# Patient Record
Sex: FEMALE | Race: WHITE | NOT HISPANIC OR LATINO
[De-identification: names, ages, dates, MRNs, and addresses within clinical notes are randomized per-mention and may not be internally consistent; named-entity substitution may affect disease eponyms.]

---

## 2017-07-11 ENCOUNTER — APPOINTMENT (OUTPATIENT)
Dept: DERMATOLOGY | Facility: CLINIC | Age: 67
End: 2017-07-11

## 2017-08-13 ENCOUNTER — TRANSCRIPTION ENCOUNTER (OUTPATIENT)
Age: 67
End: 2017-08-13

## 2017-08-17 ENCOUNTER — APPOINTMENT (OUTPATIENT)
Dept: ORTHOPEDIC SURGERY | Facility: CLINIC | Age: 67
End: 2017-08-17
Payer: MEDICARE

## 2017-08-17 VITALS
HEART RATE: 66 BPM | DIASTOLIC BLOOD PRESSURE: 72 MMHG | BODY MASS INDEX: 23.61 KG/M2 | TEMPERATURE: 97.9 F | WEIGHT: 140 LBS | SYSTOLIC BLOOD PRESSURE: 131 MMHG | HEIGHT: 64.5 IN

## 2017-08-17 DIAGNOSIS — S43.401A UNSPECIFIED SPRAIN OF RIGHT SHOULDER JOINT, INITIAL ENCOUNTER: ICD-10-CM

## 2017-08-17 PROCEDURE — 99214 OFFICE O/P EST MOD 30 MIN: CPT

## 2017-10-02 ENCOUNTER — TRANSCRIPTION ENCOUNTER (OUTPATIENT)
Age: 67
End: 2017-10-02

## 2017-10-03 ENCOUNTER — OTHER (OUTPATIENT)
Age: 67
End: 2017-10-03

## 2017-10-03 DIAGNOSIS — M25.569 PAIN IN UNSPECIFIED KNEE: ICD-10-CM

## 2017-10-04 ENCOUNTER — APPOINTMENT (OUTPATIENT)
Dept: DERMATOLOGY | Facility: CLINIC | Age: 67
End: 2017-10-04

## 2017-10-06 ENCOUNTER — OTHER (OUTPATIENT)
Age: 67
End: 2017-10-06

## 2017-10-09 ENCOUNTER — APPOINTMENT (OUTPATIENT)
Dept: ORTHOPEDIC SURGERY | Facility: CLINIC | Age: 67
End: 2017-10-09
Payer: MEDICARE

## 2017-10-09 ENCOUNTER — APPOINTMENT (OUTPATIENT)
Dept: ORTHOPEDIC SURGERY | Facility: CLINIC | Age: 67
End: 2017-10-09

## 2017-10-09 ENCOUNTER — OTHER (OUTPATIENT)
Age: 67
End: 2017-10-09

## 2017-10-09 VITALS
HEIGHT: 64.5 IN | DIASTOLIC BLOOD PRESSURE: 81 MMHG | HEART RATE: 91 BPM | BODY MASS INDEX: 23.61 KG/M2 | WEIGHT: 140 LBS | SYSTOLIC BLOOD PRESSURE: 127 MMHG

## 2017-10-09 DIAGNOSIS — T14.8XXA OTHER INJURY OF UNSPECIFIED BODY REGION, INITIAL ENCOUNTER: ICD-10-CM

## 2017-10-09 PROBLEM — Z87.19 HISTORY OF ULCERATIVE COLITIS: Status: RESOLVED | Noted: 2017-10-09 | Resolved: 2017-10-09

## 2017-10-09 PROCEDURE — 99213 OFFICE O/P EST LOW 20 MIN: CPT

## 2017-10-09 PROCEDURE — 73564 X-RAY EXAM KNEE 4 OR MORE: CPT | Mod: RT

## 2017-10-19 ENCOUNTER — APPOINTMENT (OUTPATIENT)
Dept: ORTHOPEDIC SURGERY | Facility: CLINIC | Age: 67
End: 2017-10-19

## 2017-10-23 ENCOUNTER — APPOINTMENT (OUTPATIENT)
Dept: ORTHOPEDIC SURGERY | Facility: CLINIC | Age: 67
End: 2017-10-23

## 2017-11-03 ENCOUNTER — OTHER (OUTPATIENT)
Age: 67
End: 2017-11-03

## 2018-02-06 ENCOUNTER — RESULT REVIEW (OUTPATIENT)
Age: 68
End: 2018-02-06

## 2018-02-06 ENCOUNTER — APPOINTMENT (OUTPATIENT)
Dept: DERMATOLOGY | Facility: CLINIC | Age: 68
End: 2018-02-06
Payer: MEDICARE

## 2018-02-06 PROCEDURE — 99214 OFFICE O/P EST MOD 30 MIN: CPT | Mod: 25

## 2018-02-06 PROCEDURE — 11100 BX SKIN SUBCUTANEOUS&/MUCOUS MEMBRANE 1 LESION: CPT | Mod: 59

## 2018-02-06 PROCEDURE — 17110 DESTRUCTION B9 LES UP TO 14: CPT

## 2018-03-08 ENCOUNTER — APPOINTMENT (OUTPATIENT)
Dept: SURGICAL ONCOLOGY | Facility: CLINIC | Age: 68
End: 2018-03-08
Payer: MEDICARE

## 2018-03-08 VITALS
BODY MASS INDEX: 22.26 KG/M2 | HEIGHT: 64.5 IN | TEMPERATURE: 97.8 F | DIASTOLIC BLOOD PRESSURE: 79 MMHG | OXYGEN SATURATION: 99 % | HEART RATE: 70 BPM | SYSTOLIC BLOOD PRESSURE: 135 MMHG | WEIGHT: 132 LBS

## 2018-03-08 DIAGNOSIS — Z80.41 FAMILY HISTORY OF MALIGNANT NEOPLASM OF OVARY: ICD-10-CM

## 2018-03-08 DIAGNOSIS — Z78.9 OTHER SPECIFIED HEALTH STATUS: ICD-10-CM

## 2018-03-08 DIAGNOSIS — Z80.3 FAMILY HISTORY OF MALIGNANT NEOPLASM OF BREAST: ICD-10-CM

## 2018-03-08 PROCEDURE — 99205 OFFICE O/P NEW HI 60 MIN: CPT

## 2018-03-08 RX ORDER — MESALAMINE 800 MG/1
800 TABLET, DELAYED RELEASE ORAL
Refills: 0 | Status: ACTIVE | COMMUNITY

## 2018-03-27 ENCOUNTER — OUTPATIENT (OUTPATIENT)
Dept: OUTPATIENT SERVICES | Facility: HOSPITAL | Age: 68
LOS: 1 days | End: 2018-03-27
Payer: MEDICARE

## 2018-03-27 DIAGNOSIS — Z98.89 OTHER SPECIFIED POSTPROCEDURAL STATES: Chronic | ICD-10-CM

## 2018-03-27 DIAGNOSIS — Z01.818 ENCOUNTER FOR OTHER PREPROCEDURAL EXAMINATION: ICD-10-CM

## 2018-03-27 LAB
ANION GAP SERPL CALC-SCNC: 10 MMOL/L — SIGNIFICANT CHANGE UP (ref 5–17)
BUN SERPL-MCNC: 12 MG/DL — SIGNIFICANT CHANGE UP (ref 8–20)
CALCIUM SERPL-MCNC: 9.4 MG/DL — SIGNIFICANT CHANGE UP (ref 8.6–10.2)
CHLORIDE SERPL-SCNC: 106 MMOL/L — SIGNIFICANT CHANGE UP (ref 98–107)
CO2 SERPL-SCNC: 29 MMOL/L — SIGNIFICANT CHANGE UP (ref 22–29)
CREAT SERPL-MCNC: 0.68 MG/DL — SIGNIFICANT CHANGE UP (ref 0.5–1.3)
GLUCOSE SERPL-MCNC: 90 MG/DL — SIGNIFICANT CHANGE UP (ref 70–115)
POTASSIUM SERPL-MCNC: 4 MMOL/L — SIGNIFICANT CHANGE UP (ref 3.5–5.3)
POTASSIUM SERPL-SCNC: 4 MMOL/L — SIGNIFICANT CHANGE UP (ref 3.5–5.3)
SODIUM SERPL-SCNC: 145 MMOL/L — SIGNIFICANT CHANGE UP (ref 135–145)

## 2018-03-27 PROCEDURE — G0463: CPT

## 2018-03-27 PROCEDURE — 93005 ELECTROCARDIOGRAM TRACING: CPT

## 2018-03-27 PROCEDURE — 93010 ELECTROCARDIOGRAM REPORT: CPT

## 2018-03-27 PROCEDURE — 80048 BASIC METABOLIC PNL TOTAL CA: CPT

## 2018-03-27 PROCEDURE — 36415 COLL VENOUS BLD VENIPUNCTURE: CPT

## 2018-04-04 ENCOUNTER — APPOINTMENT (OUTPATIENT)
Dept: SURGICAL ONCOLOGY | Facility: AMBULATORY SURGERY CENTER | Age: 68
End: 2018-04-04
Payer: MEDICARE

## 2018-04-04 ENCOUNTER — RESULT REVIEW (OUTPATIENT)
Age: 68
End: 2018-04-04

## 2018-04-04 PROCEDURE — 14000 TIS TRNFR TRUNK 10 SQ CM/<: CPT | Mod: 59

## 2018-04-06 ENCOUNTER — TRANSCRIPTION ENCOUNTER (OUTPATIENT)
Age: 68
End: 2018-04-06

## 2018-04-06 ENCOUNTER — APPOINTMENT (OUTPATIENT)
Dept: DERMATOLOGY | Facility: CLINIC | Age: 68
End: 2018-04-06
Payer: MEDICARE

## 2018-04-06 PROCEDURE — 99213 OFFICE O/P EST LOW 20 MIN: CPT

## 2018-04-07 ENCOUNTER — APPOINTMENT (OUTPATIENT)
Dept: DERMATOLOGY | Facility: CLINIC | Age: 68
End: 2018-04-07
Payer: MEDICARE

## 2018-04-07 PROCEDURE — 99213 OFFICE O/P EST LOW 20 MIN: CPT

## 2018-04-19 ENCOUNTER — APPOINTMENT (OUTPATIENT)
Dept: SURGICAL ONCOLOGY | Facility: CLINIC | Age: 68
End: 2018-04-19
Payer: MEDICARE

## 2018-04-19 VITALS
DIASTOLIC BLOOD PRESSURE: 78 MMHG | SYSTOLIC BLOOD PRESSURE: 113 MMHG | WEIGHT: 138 LBS | OXYGEN SATURATION: 98 % | HEIGHT: 64 IN | HEART RATE: 57 BPM | BODY MASS INDEX: 23.56 KG/M2

## 2018-04-19 PROCEDURE — 99024 POSTOP FOLLOW-UP VISIT: CPT

## 2018-05-08 ENCOUNTER — APPOINTMENT (OUTPATIENT)
Dept: DERMATOLOGY | Facility: CLINIC | Age: 68
End: 2018-05-08
Payer: MEDICARE

## 2018-05-08 PROCEDURE — 17000 DESTRUCT PREMALG LESION: CPT

## 2018-05-08 PROCEDURE — 17003 DESTRUCT PREMALG LES 2-14: CPT

## 2018-05-08 PROCEDURE — 99213 OFFICE O/P EST LOW 20 MIN: CPT | Mod: 25

## 2018-08-08 ENCOUNTER — APPOINTMENT (OUTPATIENT)
Dept: DERMATOLOGY | Facility: CLINIC | Age: 68
End: 2018-08-08
Payer: MEDICARE

## 2018-08-08 PROCEDURE — 17000 DESTRUCT PREMALG LESION: CPT

## 2018-08-08 PROCEDURE — 99213 OFFICE O/P EST LOW 20 MIN: CPT | Mod: 25

## 2018-08-30 ENCOUNTER — APPOINTMENT (OUTPATIENT)
Dept: SURGICAL ONCOLOGY | Facility: CLINIC | Age: 68
End: 2018-08-30
Payer: MEDICARE

## 2018-08-30 VITALS
TEMPERATURE: 98.5 F | HEIGHT: 64 IN | BODY MASS INDEX: 24.07 KG/M2 | OXYGEN SATURATION: 98 % | SYSTOLIC BLOOD PRESSURE: 116 MMHG | WEIGHT: 141 LBS | DIASTOLIC BLOOD PRESSURE: 74 MMHG | HEART RATE: 72 BPM

## 2018-08-30 PROCEDURE — 99214 OFFICE O/P EST MOD 30 MIN: CPT

## 2018-12-13 ENCOUNTER — APPOINTMENT (OUTPATIENT)
Dept: SURGICAL ONCOLOGY | Facility: CLINIC | Age: 68
End: 2018-12-13
Payer: MEDICARE

## 2018-12-13 VITALS
OXYGEN SATURATION: 98 % | DIASTOLIC BLOOD PRESSURE: 79 MMHG | SYSTOLIC BLOOD PRESSURE: 120 MMHG | HEIGHT: 64 IN | WEIGHT: 146 LBS | TEMPERATURE: 98.1 F | BODY MASS INDEX: 24.92 KG/M2 | HEART RATE: 69 BPM

## 2018-12-13 PROCEDURE — 99214 OFFICE O/P EST MOD 30 MIN: CPT

## 2019-01-06 ENCOUNTER — INPATIENT (INPATIENT)
Facility: HOSPITAL | Age: 69
LOS: 4 days | Discharge: HOME | DRG: 57 | End: 2019-01-11
Attending: FAMILY MEDICINE | Admitting: HOSPITALIST
Payer: MEDICARE

## 2019-01-06 VITALS
RESPIRATION RATE: 18 BRPM | TEMPERATURE: 98 F | HEIGHT: 64 IN | WEIGHT: 139.99 LBS | HEART RATE: 63 BPM | OXYGEN SATURATION: 97 % | SYSTOLIC BLOOD PRESSURE: 115 MMHG | DIASTOLIC BLOOD PRESSURE: 77 MMHG

## 2019-01-06 DIAGNOSIS — Z98.89 OTHER SPECIFIED POSTPROCEDURAL STATES: Chronic | ICD-10-CM

## 2019-01-06 DIAGNOSIS — E86.0 DEHYDRATION: ICD-10-CM

## 2019-01-06 LAB
ALBUMIN SERPL ELPH-MCNC: 4.3 G/DL — SIGNIFICANT CHANGE UP (ref 3.3–5.2)
ALP SERPL-CCNC: 70 U/L — SIGNIFICANT CHANGE UP (ref 40–120)
ALT FLD-CCNC: 13 U/L — SIGNIFICANT CHANGE UP
ANION GAP SERPL CALC-SCNC: 11 MMOL/L — SIGNIFICANT CHANGE UP (ref 5–17)
APPEARANCE UR: CLEAR — SIGNIFICANT CHANGE UP
APTT BLD: 30.6 SEC — SIGNIFICANT CHANGE UP (ref 27.5–36.3)
AST SERPL-CCNC: 15 U/L — SIGNIFICANT CHANGE UP
BACTERIA # UR AUTO: ABNORMAL
BASOPHILS # BLD AUTO: 0 K/UL — SIGNIFICANT CHANGE UP (ref 0–0.2)
BASOPHILS NFR BLD AUTO: 0.7 % — SIGNIFICANT CHANGE UP (ref 0–2)
BILIRUB SERPL-MCNC: 0.5 MG/DL — SIGNIFICANT CHANGE UP (ref 0.4–2)
BILIRUB UR-MCNC: NEGATIVE — SIGNIFICANT CHANGE UP
BUN SERPL-MCNC: 12 MG/DL — SIGNIFICANT CHANGE UP (ref 8–20)
CALCIUM SERPL-MCNC: 9.1 MG/DL — SIGNIFICANT CHANGE UP (ref 8.6–10.2)
CHLORIDE SERPL-SCNC: 103 MMOL/L — SIGNIFICANT CHANGE UP (ref 98–107)
CK SERPL-CCNC: 16 U/L — LOW (ref 25–170)
CO2 SERPL-SCNC: 27 MMOL/L — SIGNIFICANT CHANGE UP (ref 22–29)
COLOR SPEC: YELLOW — SIGNIFICANT CHANGE UP
CREAT SERPL-MCNC: 0.8 MG/DL — SIGNIFICANT CHANGE UP (ref 0.5–1.3)
DIFF PNL FLD: NEGATIVE — SIGNIFICANT CHANGE UP
EOSINOPHIL # BLD AUTO: 0.3 K/UL — SIGNIFICANT CHANGE UP (ref 0–0.5)
EOSINOPHIL NFR BLD AUTO: 5.2 % — SIGNIFICANT CHANGE UP (ref 0–6)
EPI CELLS # UR: SIGNIFICANT CHANGE UP
GLUCOSE SERPL-MCNC: 97 MG/DL — SIGNIFICANT CHANGE UP (ref 70–115)
GLUCOSE UR QL: NEGATIVE MG/DL — SIGNIFICANT CHANGE UP
HCT VFR BLD CALC: 41.5 % — SIGNIFICANT CHANGE UP (ref 37–47)
HGB BLD-MCNC: 13.5 G/DL — SIGNIFICANT CHANGE UP (ref 12–16)
INR BLD: 1.1 RATIO — SIGNIFICANT CHANGE UP (ref 0.88–1.16)
KETONES UR-MCNC: NEGATIVE — SIGNIFICANT CHANGE UP
LEUKOCYTE ESTERASE UR-ACNC: ABNORMAL
LYMPHOCYTES # BLD AUTO: 1.5 K/UL — SIGNIFICANT CHANGE UP (ref 1–4.8)
LYMPHOCYTES # BLD AUTO: 27.4 % — SIGNIFICANT CHANGE UP (ref 20–55)
MCHC RBC-ENTMCNC: 28.4 PG — SIGNIFICANT CHANGE UP (ref 27–31)
MCHC RBC-ENTMCNC: 32.5 G/DL — SIGNIFICANT CHANGE UP (ref 32–36)
MCV RBC AUTO: 87.2 FL — SIGNIFICANT CHANGE UP (ref 81–99)
MONOCYTES # BLD AUTO: 0.6 K/UL — SIGNIFICANT CHANGE UP (ref 0–0.8)
MONOCYTES NFR BLD AUTO: 11.2 % — HIGH (ref 3–10)
NEUTROPHILS # BLD AUTO: 3 K/UL — SIGNIFICANT CHANGE UP (ref 1.8–8)
NEUTROPHILS NFR BLD AUTO: 55.1 % — SIGNIFICANT CHANGE UP (ref 37–73)
NITRITE UR-MCNC: NEGATIVE — SIGNIFICANT CHANGE UP
NT-PROBNP SERPL-SCNC: 104 PG/ML — SIGNIFICANT CHANGE UP (ref 0–300)
PH UR: 7 — SIGNIFICANT CHANGE UP (ref 5–8)
PLATELET # BLD AUTO: 207 K/UL — SIGNIFICANT CHANGE UP (ref 150–400)
POTASSIUM SERPL-MCNC: 4.6 MMOL/L — SIGNIFICANT CHANGE UP (ref 3.5–5.3)
POTASSIUM SERPL-SCNC: 4.6 MMOL/L — SIGNIFICANT CHANGE UP (ref 3.5–5.3)
PROT SERPL-MCNC: 6.6 G/DL — SIGNIFICANT CHANGE UP (ref 6.6–8.7)
PROT UR-MCNC: NEGATIVE MG/DL — SIGNIFICANT CHANGE UP
PROTHROM AB SERPL-ACNC: 12.7 SEC — SIGNIFICANT CHANGE UP (ref 10–12.9)
RAPID RVP RESULT: SIGNIFICANT CHANGE UP
RBC # BLD: 4.76 M/UL — SIGNIFICANT CHANGE UP (ref 4.4–5.2)
RBC # FLD: 12.6 % — SIGNIFICANT CHANGE UP (ref 11–15.6)
RBC CASTS # UR COMP ASSIST: SIGNIFICANT CHANGE UP /HPF (ref 0–4)
SODIUM SERPL-SCNC: 141 MMOL/L — SIGNIFICANT CHANGE UP (ref 135–145)
SP GR SPEC: 1.01 — SIGNIFICANT CHANGE UP (ref 1.01–1.02)
T4 AB SER-ACNC: 10.9 UG/DL — SIGNIFICANT CHANGE UP (ref 4.5–12)
TROPONIN T SERPL-MCNC: <0.01 NG/ML — SIGNIFICANT CHANGE UP (ref 0–0.06)
TSH SERPL-MCNC: 0.86 UIU/ML — SIGNIFICANT CHANGE UP (ref 0.27–4.2)
UROBILINOGEN FLD QL: NEGATIVE MG/DL — SIGNIFICANT CHANGE UP
WBC # BLD: 5.4 K/UL — SIGNIFICANT CHANGE UP (ref 4.8–10.8)
WBC # FLD AUTO: 5.4 K/UL — SIGNIFICANT CHANGE UP (ref 4.8–10.8)
WBC UR QL: SIGNIFICANT CHANGE UP

## 2019-01-06 PROCEDURE — 74177 CT ABD & PELVIS W/CONTRAST: CPT | Mod: 26

## 2019-01-06 PROCEDURE — 99285 EMERGENCY DEPT VISIT HI MDM: CPT

## 2019-01-06 PROCEDURE — 99223 1ST HOSP IP/OBS HIGH 75: CPT

## 2019-01-06 PROCEDURE — 93010 ELECTROCARDIOGRAM REPORT: CPT

## 2019-01-06 PROCEDURE — 70450 CT HEAD/BRAIN W/O DYE: CPT | Mod: 26

## 2019-01-06 PROCEDURE — 71045 X-RAY EXAM CHEST 1 VIEW: CPT | Mod: 26

## 2019-01-06 RX ORDER — CEFTRIAXONE 500 MG/1
1 INJECTION, POWDER, FOR SOLUTION INTRAMUSCULAR; INTRAVENOUS ONCE
Qty: 0 | Refills: 0 | Status: DISCONTINUED | OUTPATIENT
Start: 2019-01-06 | End: 2019-01-06

## 2019-01-06 RX ORDER — LEVOTHYROXINE SODIUM 125 MCG
125 TABLET ORAL DAILY
Qty: 0 | Refills: 0 | Status: DISCONTINUED | OUTPATIENT
Start: 2019-01-06 | End: 2019-01-11

## 2019-01-06 RX ORDER — DIAZEPAM 5 MG
1 TABLET ORAL
Qty: 0 | Refills: 0 | COMMUNITY

## 2019-01-06 RX ORDER — ENOXAPARIN SODIUM 100 MG/ML
40 INJECTION SUBCUTANEOUS EVERY 24 HOURS
Qty: 0 | Refills: 0 | Status: DISCONTINUED | OUTPATIENT
Start: 2019-01-06 | End: 2019-01-11

## 2019-01-06 RX ORDER — METRONIDAZOLE 500 MG
500 TABLET ORAL ONCE
Qty: 0 | Refills: 0 | Status: COMPLETED | OUTPATIENT
Start: 2019-01-06 | End: 2019-01-06

## 2019-01-06 RX ORDER — IPRATROPIUM/ALBUTEROL SULFATE 18-103MCG
3 AEROSOL WITH ADAPTER (GRAM) INHALATION EVERY 6 HOURS
Qty: 0 | Refills: 0 | Status: DISCONTINUED | OUTPATIENT
Start: 2019-01-06 | End: 2019-01-07

## 2019-01-06 RX ORDER — SODIUM CHLORIDE 9 MG/ML
1000 INJECTION INTRAMUSCULAR; INTRAVENOUS; SUBCUTANEOUS ONCE
Qty: 0 | Refills: 0 | Status: COMPLETED | OUTPATIENT
Start: 2019-01-06 | End: 2019-01-06

## 2019-01-06 RX ORDER — SODIUM CHLORIDE 9 MG/ML
2000 INJECTION INTRAMUSCULAR; INTRAVENOUS; SUBCUTANEOUS ONCE
Qty: 0 | Refills: 0 | Status: COMPLETED | OUTPATIENT
Start: 2019-01-06 | End: 2019-01-06

## 2019-01-06 RX ORDER — ALBUTEROL 90 UG/1
1 AEROSOL, METERED ORAL EVERY 4 HOURS
Qty: 0 | Refills: 0 | Status: DISCONTINUED | OUTPATIENT
Start: 2019-01-06 | End: 2019-01-11

## 2019-01-06 RX ORDER — PANTOPRAZOLE SODIUM 20 MG/1
40 TABLET, DELAYED RELEASE ORAL EVERY 12 HOURS
Qty: 0 | Refills: 0 | Status: DISCONTINUED | OUTPATIENT
Start: 2019-01-06 | End: 2019-01-11

## 2019-01-06 RX ORDER — SACCHAROMYCES BOULARDII 250 MG
250 POWDER IN PACKET (EA) ORAL
Qty: 0 | Refills: 0 | Status: DISCONTINUED | OUTPATIENT
Start: 2019-01-06 | End: 2019-01-07

## 2019-01-06 RX ORDER — MECLIZINE HCL 12.5 MG
25 TABLET ORAL EVERY 6 HOURS
Qty: 0 | Refills: 0 | Status: DISCONTINUED | OUTPATIENT
Start: 2019-01-06 | End: 2019-01-11

## 2019-01-06 RX ORDER — SUCRALFATE 1 G
1 TABLET ORAL
Qty: 0 | Refills: 0 | Status: DISCONTINUED | OUTPATIENT
Start: 2019-01-06 | End: 2019-01-11

## 2019-01-06 RX ORDER — CIPROFLOXACIN LACTATE 400MG/40ML
400 VIAL (ML) INTRAVENOUS ONCE
Qty: 0 | Refills: 0 | Status: COMPLETED | OUTPATIENT
Start: 2019-01-06 | End: 2019-01-06

## 2019-01-06 RX ORDER — SODIUM CHLORIDE 9 MG/ML
1000 INJECTION INTRAMUSCULAR; INTRAVENOUS; SUBCUTANEOUS
Qty: 0 | Refills: 0 | Status: DISCONTINUED | OUTPATIENT
Start: 2019-01-06 | End: 2019-01-09

## 2019-01-06 RX ADMIN — Medication 100 MILLIGRAM(S): at 18:55

## 2019-01-06 RX ADMIN — Medication 100 MILLIGRAM(S): at 18:05

## 2019-01-06 RX ADMIN — Medication 500 MILLIGRAM(S): at 19:55

## 2019-01-06 RX ADMIN — Medication 400 MILLIGRAM(S): at 19:05

## 2019-01-06 RX ADMIN — SODIUM CHLORIDE 1000 MILLILITER(S): 9 INJECTION INTRAMUSCULAR; INTRAVENOUS; SUBCUTANEOUS at 14:39

## 2019-01-06 RX ADMIN — SODIUM CHLORIDE 2000 MILLILITER(S): 9 INJECTION INTRAMUSCULAR; INTRAVENOUS; SUBCUTANEOUS at 19:06

## 2019-01-06 RX ADMIN — SODIUM CHLORIDE 150 MILLILITER(S): 9 INJECTION INTRAMUSCULAR; INTRAVENOUS; SUBCUTANEOUS at 12:42

## 2019-01-06 RX ADMIN — SODIUM CHLORIDE 2000 MILLILITER(S): 9 INJECTION INTRAMUSCULAR; INTRAVENOUS; SUBCUTANEOUS at 18:06

## 2019-01-06 RX ADMIN — SODIUM CHLORIDE 1000 MILLILITER(S): 9 INJECTION INTRAMUSCULAR; INTRAVENOUS; SUBCUTANEOUS at 13:39

## 2019-01-06 NOTE — ED ADULT TRIAGE NOTE - CHIEF COMPLAINT QUOTE
'I feel sick, I am weak and dizzy, I saw Dr Allred yesterday and they said if I wasn't better to go to the ED. " Pt A & OX4.

## 2019-01-06 NOTE — ED STATDOCS - PROGRESS NOTE DETAILS
69 y/o F pt with hx of loop recorder, IBS, appendicitis, diverticulitis, and hypothyroidism presents to ED c/o worsening weakness, and dizziness. Pt reports she has been sick for about 2 weeks with head pressure, decreased appetite, lightheadedness, nausea, with minimal vomiting. Pt has seen multiple doctors within the last 3-4 weeks; can't seem to figure out what is wrong with her according to pt. Her first visit with the doctor was on 12/23 and her most recent visit was yesterday. Pt had labs and a sonogram (thyroid) done at [facility] office yesterday; and was told she may be anemic or it may be a fibroid complication. Pt has been able to tolerate PO fluids; pt states she ate 2 bowls of soup yesterday. Denies dysuria. No further complaints at this time.

## 2019-01-06 NOTE — ED STATDOCS - OBJECTIVE STATEMENT
67 y/o F pt with hx of presents to ED c/o dehydration with associated weakness, and dizziness. Denies . No further complaints at this time.

## 2019-01-06 NOTE — H&P ADULT - ASSESSMENT
68 year old female with PMH UC, hypothyroidism presenting with weakness,  dizziness and dysphagia - unclear etiology. Diagnostics unrevealing, mild colitis - doubt infection. Mild hypoxemia (93-95% throughout interaction)  Multiple visits to clinicians without respite  Now debilitating and interfering with ADL    Dizziness - r/o ischemia, tumour, arrhythmia - Monitor, TTE, MRI, MRA. Neurology consult. PT    Ulcerative Colitis - with mild flare low residue diet, Mesalamine regularly. No antibiotics    Hypoxia - monitor, O2, Nebs. CTA chest    Dysphagia - likely gastritis/esophagitis; Carafate, PPI    Prophylactic measure - LMWH,

## 2019-01-06 NOTE — ED PROVIDER NOTE - OBJECTIVE STATEMENT
The patient is a 68 year old female presents with 2 weeks history of generalized weakness and LH and decreased PO intake.  The patient seen PMD, [facility] 3 times in past 2 weeks and unable to give diagnosis. No CP, No SOB, Mild HA, Mild Abd pain

## 2019-01-06 NOTE — H&P ADULT - HISTORY OF PRESENT ILLNESS
68 year old female with PMH UC 68 year old female with PMH UC, Hypothyroidism presents with 2 week history of not feeling well.   She was fine before December and suffered from Colitis all through the month for which she has been having Mesalamine and Imodium intermittently. She has frequent multiple small bowel movement which are occasionally bloody which she attributes to her internal hemorrhoids. She states she cannot swallow and also has feeling of something stuck in throat and states she had a thyroid sonogram recently. Occasional reflux and frequent burping. Denies any odynophagia.    Her major complaints are feeling very weak, dizzy and lightheaded - so much so that she is does not ambulate much and stays home. She states her head feels like a vise and gestures to right side of neck. She has been undergoing acupuncture.   She has been to her PMD office multiple times as well as an urgent care center and came to the ER as worse today.

## 2019-01-06 NOTE — H&P ADULT - NSHPREVIEWOFSYSTEMS_GEN_ALL_CORE
Denies any fever, chills, chest pain, palpitations, dyspnea, dysuria, frequency, arthralgias or rash

## 2019-01-07 DIAGNOSIS — K57.92 DIVERTICULITIS OF INTESTINE, PART UNSPECIFIED, WITHOUT PERFORATION OR ABSCESS WITHOUT BLEEDING: ICD-10-CM

## 2019-01-07 DIAGNOSIS — E86.0 DEHYDRATION: ICD-10-CM

## 2019-01-07 DIAGNOSIS — R42 DIZZINESS AND GIDDINESS: ICD-10-CM

## 2019-01-07 LAB
CHOLEST SERPL-MCNC: 138 MG/DL — SIGNIFICANT CHANGE UP (ref 110–199)
ERYTHROCYTE [SEDIMENTATION RATE] IN BLOOD: 12 MM/HR — SIGNIFICANT CHANGE UP (ref 0–20)
HBA1C BLD-MCNC: 5.2 % — SIGNIFICANT CHANGE UP (ref 4–5.6)
HDLC SERPL-MCNC: 24 MG/DL — LOW
LIPID PNL WITH DIRECT LDL SERPL: 71 MG/DL — SIGNIFICANT CHANGE UP
TOTAL CHOLESTEROL/HDL RATIO MEASUREMENT: 6 RATIO — SIGNIFICANT CHANGE UP (ref 3.3–7.1)
TRIGL SERPL-MCNC: 217 MG/DL — HIGH (ref 10–200)

## 2019-01-07 PROCEDURE — 99223 1ST HOSP IP/OBS HIGH 75: CPT

## 2019-01-07 PROCEDURE — 70544 MR ANGIOGRAPHY HEAD W/O DYE: CPT | Mod: 26

## 2019-01-07 PROCEDURE — 72141 MRI NECK SPINE W/O DYE: CPT | Mod: 26

## 2019-01-07 PROCEDURE — 70548 MR ANGIOGRAPHY NECK W/DYE: CPT | Mod: 26

## 2019-01-07 PROCEDURE — 93306 TTE W/DOPPLER COMPLETE: CPT | Mod: 26

## 2019-01-07 PROCEDURE — 70551 MRI BRAIN STEM W/O DYE: CPT | Mod: 26

## 2019-01-07 RX ADMIN — Medication 3 MILLILITER(S): at 04:12

## 2019-01-07 RX ADMIN — ENOXAPARIN SODIUM 40 MILLIGRAM(S): 100 INJECTION SUBCUTANEOUS at 05:18

## 2019-01-07 RX ADMIN — Medication 125 MICROGRAM(S): at 05:18

## 2019-01-07 RX ADMIN — Medication 1 GRAM(S): at 05:19

## 2019-01-07 RX ADMIN — Medication 250 MILLIGRAM(S): at 05:18

## 2019-01-07 RX ADMIN — Medication 25 MILLIGRAM(S): at 05:18

## 2019-01-07 RX ADMIN — SODIUM CHLORIDE 150 MILLILITER(S): 9 INJECTION INTRAMUSCULAR; INTRAVENOUS; SUBCUTANEOUS at 19:42

## 2019-01-07 RX ADMIN — PANTOPRAZOLE SODIUM 40 MILLIGRAM(S): 20 TABLET, DELAYED RELEASE ORAL at 05:18

## 2019-01-07 NOTE — PROGRESS NOTE ADULT - SUBJECTIVE AND OBJECTIVE BOX
SUBJECTIVE    pt complains of the shakes/tremors and difficulty swallowing    REVIEW OF SYSTEMS    General: Not in any pain	    Skin/Breast: No rash  	  ENMT: No visual problems, no sore throat	    Respiratory and Thorax: No cough, No CP, No SOB  	  Cardiovascular: No CP, No palpitations    Gastrointestinal: No Abd pain, No N/V/D    Musculoskeletal: No Joint pain, No back pain	    Neurological: No headache    Psychiatric: No anxiety      OBJECTIVE    Vital Signs Last 24 Hrs  T(C): 36.8 (01-07-19 @ 18:09), Max: 37.2 (01-07-19 @ 03:19)  T(F): 98.2 (01-07-19 @ 18:09), Max: 98.9 (01-07-19 @ 03:19)  HR: 65 (01-07-19 @ 18:09) (58 - 74)  BP: 103/67 (01-07-19 @ 18:09) (99/61 - 132/63)  BP(mean): 82 (01-06-19 @ 21:20) (82 - 82)  RR: 18 (01-07-19 @ 15:36) (16 - 18)  SpO2: 95% (01-07-19 @ 18:09) (95% - 98%)    PHYSICAL EXAM:    Constitutional: Not in any distress    Eyes: No conjunctival injection    ENMT: No oral lesions    Neck: No nodes, no adenopathy    Back: Straight, no defects    Respiratory: clear b/l    Cardiovascular: RRR, no murmur    Gastrointestinal: soft, NT, ND    Extremities: No edema, no erythema    Neurological: no focal deficit    Skin: No rash      MEDICATIONS  (STANDING):  ALBUTerol    90 MICROgram(s) HFA Inhaler 1 Puff(s) Inhalation every 4 hours  enoxaparin Injectable 40 milliGRAM(s) SubCutaneous every 24 hours  levothyroxine 125 MICROGram(s) Oral daily  pantoprazole    Tablet 40 milliGRAM(s) Oral every 12 hours  sodium chloride 0.9%. 1000 milliLiter(s) (150 mL/Hr) IV Continuous <Continuous>  sucralfate 1 Gram(s) Oral four times a day    MEDICATIONS  (PRN):  meclizine 25 milliGRAM(s) Oral every 6 hours PRN Dizziness    CARDIAC MARKERS ( 06 Jan 2019 13:12 )  x     / <0.01 ng/mL / 16 U/L / x     / x                                13.5   5.4   )-----------( 207      ( 06 Jan 2019 13:12 )             41.5     06 Jan 2019 13:12    141    |  103    |  12.0   ----------------------------<  97     4.6     |  27.0   |  0.80     Ca    9.1        06 Jan 2019 13:12    TPro  6.6    /  Alb  4.3    /  TBili  0.5    /  DBili  x      /  AST  15     /  ALT  13     /  AlkPhos  70     06 Jan 2019 13:12    Allergies    Sulfacetamide Sodium (Hives)    Intolerances

## 2019-01-07 NOTE — CONSULT NOTE ADULT - SUBJECTIVE AND OBJECTIVE BOX
[facility]                                        Neurology at [place]                                  My[facility]na Faust [facility]                                      [address]                                           [place], [place], [address]                                                [phone]        CC: headache, dizziness, and tremor.    HISTORY:  The patient is a 68y Female who reports that for the past few weeks she has been having pressure type headache and dizziness. She describes lightheaded sensations rather than true vertigo.   she has also had difficulty swallowing due to pain in her throat.   The symptoms have been with her on  a daily basis and have been gradually getting worse.   There is no associated limb weakness.     She also reports that she has been having tremors of the right hand for the past several months.     PAST MEDICAL & SURGICAL HISTORY:  Appendicitis  Other specified hypothyroidism  IBS (irritable bowel syndrome)  She reports that she does acupuncture for colitis.    Diverticulitis  History of appendectomy  History of cholecystectomy    MEDICATION PRIOR TO ADMISSION:  Loratadine, Levothyroxine, Mesalamine, Valium.    MEDICATIONS  (STANDING):  ALBUTerol    90 MICROgram(s) HFA Inhaler 1 Puff(s) Inhalation every 4 hours  ALBUTerol/ipratropium for Nebulization 3 milliLiter(s) Nebulizer every 6 hours  enoxaparin Injectable 40 milliGRAM(s) SubCutaneous every 24 hours  levothyroxine 125 MICROGram(s) Oral daily  pantoprazole    Tablet 40 milliGRAM(s) Oral every 12 hours  saccharomyces boulardii 250 milliGRAM(s) Oral two times a day  sodium chloride 0.9%. 1000 milliLiter(s) (150 mL/Hr) IV Continuous <Continuous>  sucralfate 1 Gram(s) Oral four times a day    MEDICATIONS  (PRN):  meclizine 25 milliGRAM(s) Oral every 6 hours PRN Dizziness      Allergies  Sulfacetamide Sodium (Hives)    SOCIAL HISTORY:  Non smoker.     FAMILY HISTORY:  Family history of heart failure (Mother)  Mother had Parkinson's disease.  Mother/Father .  No siblings.  Children alive and well.  Grandson with brain tumor.    ROS:  Constitutional: The patient denies fevers or weight changes.  Neuro: As per HPI.  Eyes: Denies blurry vision.  Ears/nose/throat: Denies Tinnitus.   Cardiac: Denies chest pain. Denies palpitations.  Respiratory: Denies shortness of breath.  GI: Denies abdominal pain, nausea, or vomiting.  : Denies change in urinary pattern.  Integumentary: Denies rash.  Psych: Denies recent mood changes.  Heme: denies easy bleeding/bruising.    Exam:  Vital Signs Last 24 Hrs  T(C): 36.4 (2019 05:56), Max: 37.2 (2019 03:19)  T(F): 97.6 (2019 05:56), Max: 98.9 (2019 03:19)  HR: 62 (2019 08:53) (58 - 65)  BP: 132/63 (2019 08:53) (99/61 - 132/63)  BP(mean): 82 (2019 21:20) (82 - 82)  RR: 18 (2019 08:53) (16 - 18)  SpO2: 98% (2019 08:53) (95% - 98%)  General: NAD.   Carotid bruits absent.     Mental status: The patient is awake, alert, and fully oriented. There is no aphasia.    Cranial nerves: There is no papilledema. Pupils react symmetrically to light. There is no visual field deficit to confrontation. Extraocular motion is full with no nystagmus. There is no ptosis. Facial sensation is intact. Facial musculature is symmetric. Palate elevates symmetrically. Tongue is midline.    Motor: There is normal bulk. There is some cogwheeling of the wrists with reinforcement bilaterally. There is no tremor at rest at this time.   Strength is 5/5 in the right arm and leg.   Strength is 5/5 in the left arm and leg.    Sensation: Intact to light touch and pin.    Reflexes: 2+ throughout and plantar responses are flexor.    Cerebellar: There is no dysmetria on finger to nose testing.    LABS:                         13.5   5.4   )-----------( 207      ( 2019 13:12 )             41.5       01-06    141  |  103  |  12.0  ----------------------------<  97  4.6   |  27.0  |  0.80    Ca    9.1      2019 13:12    TPro  6.6  /  Alb  4.3  /  TBili  0.5  /  DBili  x   /  AST  15  /  ALT  13  /  AlkPhos  70  01-06      PT/INR - ( 2019 13:12 )   PT: 12.7 sec;   INR: 1.10 ratio    PTT - ( 2019 13:12 )  PTT:30.6 sec    RADIOLOGY   CT head images reviewed (and concur with report): There is no acute pathology.

## 2019-01-07 NOTE — ED ADULT NURSE REASSESSMENT NOTE - NS ED NURSE REASSESS COMMENT FT1
assumed care of patient at 2330, alert and oriented x4, resting comfortably in bed at this time, no distress noted, awaiting bed assignment. Safety maintained. assumed care of patient at 2330, alert and oriented x4, resting comfortably in bed at this time, no distress noted. Pt c.o dizziness with standing, denies now. awaiting bed assignment. Safety maintained.

## 2019-01-07 NOTE — PROGRESS NOTE ADULT - PROBLEM SELECTOR PLAN 1
with dysphagia; for swallow eval; reviewed, MRIs, MRAs, TTE, CTs, labs - no evid of acute neurological event; p/t eval; for outpt neuro f/u for poss Parkinson's

## 2019-01-07 NOTE — CONSULT NOTE ADULT - ASSESSMENT
The patient is a 68y Female with multiple complaints including headache, dizziness, and swallowing difficulty which have been increasing over the past few weeks. She also notes tremors for the past few months.     Headache/Dizziness.   Agree with checking MRI brain with MRA brain and neck.  Will add ESR.    Neck pain.  MRI cervical spine added.     Swallowing difficulty  Will have speech path evaluate.   If above all negative then may need ENT evaluation for direct visualization of pharynx.     Tremor.   Suspect mild Parkinson's disease.  Can be managed as outpatient.     Case discussed with Dr Morrison.

## 2019-01-07 NOTE — ED ADULT NURSE REASSESSMENT NOTE - NS ED NURSE REASSESS COMMENT FT1
pt received A+Ox3, no apparent distress noted. pt remains on cardiac monitor, VS WNL at this time. pt states she feels like she is having trouble swallowing, dysphagia screen performed, pt failed. pt states she feels like she is "choking to death." pt to remain NPO at this time. respirations even and unlabored, moving all extremities well. pt states she feels "pressure" in her head. pt ambulated to bathroom well with steady gait. dr moore neurologist at bedside evaluating pt at this time. pt educated on POC.

## 2019-01-08 DIAGNOSIS — E03.8 OTHER SPECIFIED HYPOTHYROIDISM: ICD-10-CM

## 2019-01-08 LAB
ANION GAP SERPL CALC-SCNC: 11 MMOL/L — SIGNIFICANT CHANGE UP (ref 5–17)
BUN SERPL-MCNC: 9 MG/DL — SIGNIFICANT CHANGE UP (ref 8–20)
CALCIUM SERPL-MCNC: 8.4 MG/DL — LOW (ref 8.6–10.2)
CHLORIDE SERPL-SCNC: 109 MMOL/L — HIGH (ref 98–107)
CO2 SERPL-SCNC: 22 MMOL/L — SIGNIFICANT CHANGE UP (ref 22–29)
CREAT SERPL-MCNC: 0.63 MG/DL — SIGNIFICANT CHANGE UP (ref 0.5–1.3)
GLUCOSE SERPL-MCNC: 80 MG/DL — SIGNIFICANT CHANGE UP (ref 70–115)
POTASSIUM SERPL-MCNC: 3.9 MMOL/L — SIGNIFICANT CHANGE UP (ref 3.5–5.3)
POTASSIUM SERPL-SCNC: 3.9 MMOL/L — SIGNIFICANT CHANGE UP (ref 3.5–5.3)
SODIUM SERPL-SCNC: 142 MMOL/L — SIGNIFICANT CHANGE UP (ref 135–145)

## 2019-01-08 PROCEDURE — 99233 SBSQ HOSP IP/OBS HIGH 50: CPT

## 2019-01-08 RX ORDER — CEFTRIAXONE 500 MG/1
1 INJECTION, POWDER, FOR SOLUTION INTRAMUSCULAR; INTRAVENOUS ONCE
Qty: 0 | Refills: 0 | Status: COMPLETED | OUTPATIENT
Start: 2019-01-08 | End: 2019-01-08

## 2019-01-08 RX ORDER — KETOROLAC TROMETHAMINE 30 MG/ML
15 SYRINGE (ML) INJECTION ONCE
Qty: 0 | Refills: 0 | Status: DISCONTINUED | OUTPATIENT
Start: 2019-01-08 | End: 2019-01-08

## 2019-01-08 RX ADMIN — SODIUM CHLORIDE 150 MILLILITER(S): 9 INJECTION INTRAMUSCULAR; INTRAVENOUS; SUBCUTANEOUS at 20:30

## 2019-01-08 RX ADMIN — Medication 1 GRAM(S): at 23:22

## 2019-01-08 RX ADMIN — ENOXAPARIN SODIUM 40 MILLIGRAM(S): 100 INJECTION SUBCUTANEOUS at 05:39

## 2019-01-08 RX ADMIN — Medication 1 GRAM(S): at 14:13

## 2019-01-08 RX ADMIN — Medication 15 MILLIGRAM(S): at 12:22

## 2019-01-08 RX ADMIN — CEFTRIAXONE 100 GRAM(S): 500 INJECTION, POWDER, FOR SOLUTION INTRAMUSCULAR; INTRAVENOUS at 14:13

## 2019-01-08 RX ADMIN — Medication 15 MILLIGRAM(S): at 12:40

## 2019-01-08 RX ADMIN — SODIUM CHLORIDE 150 MILLILITER(S): 9 INJECTION INTRAMUSCULAR; INTRAVENOUS; SUBCUTANEOUS at 12:30

## 2019-01-08 RX ADMIN — PANTOPRAZOLE SODIUM 40 MILLIGRAM(S): 20 TABLET, DELAYED RELEASE ORAL at 17:03

## 2019-01-08 RX ADMIN — Medication 1 GRAM(S): at 17:03

## 2019-01-08 RX ADMIN — SODIUM CHLORIDE 150 MILLILITER(S): 9 INJECTION INTRAMUSCULAR; INTRAVENOUS; SUBCUTANEOUS at 05:40

## 2019-01-08 NOTE — PROGRESS NOTE ADULT - ASSESSMENT
68y Female with multiple complaints including headache, dizziness, and swallowing difficulty which have been increasing over the past few weeks. She also notes tremors for the past few months.     Headache/Dizziness.   Improved.   Work up negative.  Symptomatic treatment.     Neck pain.  MRI cervical spine with mild degenerative changes.     Swallowing difficulty  Will have speech path evaluate.   ENT evaluation for direct visualization of pharynx may be needed if no improvement.     Tremor.   Suspect mild Parkinson's disease.  Can be further evaluated and managed as outpatient.   Would need Muna scan.    No further specific neurologic recommendations while in hospital. Will be available as needed.     Case discussed with Dr Allred.

## 2019-01-08 NOTE — PROGRESS NOTE ADULT - SUBJECTIVE AND OBJECTIVE BOX
SUBJECTIVE    just complains of neck stiffness and difficulty swallowing    REVIEW OF SYSTEMS    General: Not in any pain	    Skin/Breast: No rash  	  ENMT: No visual problems, no sore throat	    Respiratory and Thorax: No cough, No CP, No SOB  	  Cardiovascular: No CP, No palpitations    Gastrointestinal: No Abd pain, No N/V/D    Musculoskeletal: No Joint pain, No back pain	    Neurological: No headache    Psychiatric: No anxiety      OBJECTIVE    Vital Signs Last 24 Hrs  T(C): 36.6 (01-08-19 @ 12:20), Max: 36.8 (01-07-19 @ 18:09)  T(F): 97.8 (01-08-19 @ 12:20), Max: 98.2 (01-07-19 @ 18:09)  HR: 66 (01-08-19 @ 12:20) (58 - 66)  BP: 129/72 (01-08-19 @ 12:20) (103/67 - 134/68)  BP(mean): --  RR: 18 (01-08-19 @ 12:20) (16 - 18)  SpO2: 97% (01-08-19 @ 12:20) (95% - 98%)    PHYSICAL EXAM:    Constitutional: Not in any distress    Eyes: No conjunctival injection    ENMT: No oral lesions    Neck: No nodes, no adenopathy    Back: Straight, no defects    Respiratory: clear b/l    Cardiovascular: RRR, no murmur    Gastrointestinal: soft, NT, ND    Extremities: No edema, no erythema    Neurological: no focal deficit    Skin: No rash      MEDICATIONS  (STANDING):  ALBUTerol    90 MICROgram(s) HFA Inhaler 1 Puff(s) Inhalation every 4 hours  enoxaparin Injectable 40 milliGRAM(s) SubCutaneous every 24 hours  levothyroxine 125 MICROGram(s) Oral daily  pantoprazole    Tablet 40 milliGRAM(s) Oral every 12 hours  sodium chloride 0.9%. 1000 milliLiter(s) (150 mL/Hr) IV Continuous <Continuous>  sucralfate 1 Gram(s) Oral four times a day    MEDICATIONS  (PRN):  meclizine 25 milliGRAM(s) Oral every 6 hours PRN Dizziness          08 Jan 2019 07:10    142    |  109    |  9.0    ----------------------------<  80     3.9     |  22.0   |  0.63     Ca    8.4        08 Jan 2019 07:10      Allergies    Sulfacetamide Sodium (Hives)    Intolerances

## 2019-01-08 NOTE — SWALLOW BEDSIDE ASSESSMENT ADULT - PHARYNGEAL PHASE
alternating food and liquids did not alleviate stasis in pharyngeal area/Complaints of pharyngeal stasis/Multiple swallows Complaints of pharyngeal stasis/Multiple swallows

## 2019-01-08 NOTE — SWALLOW BEDSIDE ASSESSMENT ADULT - SWALLOW EVAL: DIAGNOSIS
Oral stage of swallow judged to be WFL. Suspect pharyngeal dysphagia for all po given. Pt reports a globus sensation in pharyngeal area. Alternating food and liquids did not alleviate globus sensation. Pt reports having difficulty swallowing for about 3 weeks

## 2019-01-08 NOTE — SWALLOW BEDSIDE ASSESSMENT ADULT - SLP PERTINENT HISTORY OF CURRENT PROBLEM
As per h&p:  presents with 2 week history of not feeling well. She was fine before December and suffered from Colitis all through the month for which she has been having Mesalamine and Imodium intermittently. She has frequent multiple small bowel movement which are occasionally bloody which she attributes to her internal hemorrhoids. She states she cannot swallow and also has feeling of something stuck in throat and states she had a thyroid sonogram recently. Occasional reflux and frequent burping. Denies any odynophagia.

## 2019-01-08 NOTE — PROGRESS NOTE ADULT - SUBJECTIVE AND OBJECTIVE BOX
[facility]                                        Neurology at [place]                                 Myrna Faust, & Kole                                  [address]                                        [place], [place], [address]                                             [phone]        CC: headache, dizziness, and tremor.    HPI:   The patient is a 68y Female who reports that for the past few weeks she has been having pressure type headache and dizziness. She describes lightheaded sensations rather than true vertigo.   She has also had difficulty swallowing due to pain in her throat.   The symptoms have been with her on  a daily basis and have been gradually getting worse.   There is no associated limb weakness.     She also reports that she has been having tremors of the right hand for the past several months.     Interim history:  She reports that the pain in her neck/throat persists although decreased from yesterday.  The headache has improved.   The dizziness has improved.   The tremor persists.     ROS:   Denies headache or dizziness.  Denies chest pain.  Denies shortness of breath.    MEDICATIONS  (STANDING):  ALBUTerol    90 MICROgram(s) HFA Inhaler 1 Puff(s) Inhalation every 4 hours  cefTRIAXone   IVPB 1 Gram(s) IV Intermittent once  enoxaparin Injectable 40 milliGRAM(s) SubCutaneous every 24 hours  ketorolac   Injectable 15 milliGRAM(s) IV Push once  levothyroxine 125 MICROGram(s) Oral daily  pantoprazole    Tablet 40 milliGRAM(s) Oral every 12 hours  sodium chloride 0.9%. 1000 milliLiter(s) (150 mL/Hr) IV Continuous <Continuous>  sucralfate 1 Gram(s) Oral four times a day      Vital Signs Last 24 Hrs  T(C): 36.4 (08 Jan 2019 04:08), Max: 36.8 (07 Jan 2019 18:09)  T(F): 97.5 (08 Jan 2019 04:08), Max: 98.2 (07 Jan 2019 18:09)  HR: 58 (08 Jan 2019 08:00) (58 - 74)  BP: 125/70 (08 Jan 2019 06:41) (103/67 - 134/68)  RR: 16 (07 Jan 2019 21:13) (16 - 18)  SpO2: 98% (08 Jan 2019 10:18) (95% - 98%)    Detailed Neurologic Exam:    Mental status: The patient is awake, alert, and fully oriented. There is no aphasia.    Cranial nerves: There is no papilledema. Pupils react symmetrically to light. There is no visual field deficit to confrontation. Extraocular motion is full with no nystagmus. There is no ptosis. Facial sensation is intact. Facial musculature is symmetric. Palate elevates symmetrically. Tongue is midline.    Motor: There is normal bulk. There is some cogwheeling of the wrists with reinforcement bilaterally. There is no tremor at rest at this time.   Strength is 5/5 in the right arm and leg.   Strength is 5/5 in the left arm and leg.    Sensation: Intact to light touch and pin.    Reflexes: 2+ throughout and plantar responses are flexor.    Cerebellar: No dysmetria on finger nose testing.    Labs:     01-08    142  |  109<H>  |  9.0  ----------------------------<  80  3.9   |  22.0  |  0.63    Ca    8.4<L>      08 Jan 2019 07:10    TPro  6.6  /  Alb  4.3  /  TBili  0.5  /  DBili  x   /  AST  15  /  ALT  13  /  AlkPhos  70  01-06                            13.5   5.4   )-----------( 207      ( 06 Jan 2019 13:12 )             41.5     ESR: 12      Rad:   MRI brain images reviewed (and concur with report): There is no acute pathology.   MRA brain and neck were unremarkable.   MRI of the cervical spine demonstrated multilevel degenerative changes and disc bulges with some reversal of the normal cervical curve consistent with muscle spasm.

## 2019-01-09 DIAGNOSIS — N39.0 URINARY TRACT INFECTION, SITE NOT SPECIFIED: ICD-10-CM

## 2019-01-09 LAB
-  AMIKACIN: SIGNIFICANT CHANGE UP
-  AMPICILLIN/SULBACTAM: SIGNIFICANT CHANGE UP
-  AMPICILLIN: SIGNIFICANT CHANGE UP
-  AZTREONAM: SIGNIFICANT CHANGE UP
-  CEFAZOLIN: SIGNIFICANT CHANGE UP
-  CEFEPIME: SIGNIFICANT CHANGE UP
-  CEFOXITIN: SIGNIFICANT CHANGE UP
-  CEFTRIAXONE: SIGNIFICANT CHANGE UP
-  CIPROFLOXACIN: SIGNIFICANT CHANGE UP
-  ERTAPENEM: SIGNIFICANT CHANGE UP
-  GENTAMICIN: SIGNIFICANT CHANGE UP
-  IMIPENEM: SIGNIFICANT CHANGE UP
-  LEVOFLOXACIN: SIGNIFICANT CHANGE UP
-  MEROPENEM: SIGNIFICANT CHANGE UP
-  NITROFURANTOIN: SIGNIFICANT CHANGE UP
-  PIPERACILLIN/TAZOBACTAM: SIGNIFICANT CHANGE UP
-  TIGECYCLINE: SIGNIFICANT CHANGE UP
-  TOBRAMYCIN: SIGNIFICANT CHANGE UP
-  TRIMETHOPRIM/SULFAMETHOXAZOLE: SIGNIFICANT CHANGE UP
ANION GAP SERPL CALC-SCNC: 17 MMOL/L — SIGNIFICANT CHANGE UP (ref 5–17)
BUN SERPL-MCNC: 11 MG/DL — SIGNIFICANT CHANGE UP (ref 8–20)
CALCIUM SERPL-MCNC: 8.4 MG/DL — LOW (ref 8.6–10.2)
CHLORIDE SERPL-SCNC: 108 MMOL/L — HIGH (ref 98–107)
CO2 SERPL-SCNC: 16 MMOL/L — LOW (ref 22–29)
CREAT SERPL-MCNC: 0.67 MG/DL — SIGNIFICANT CHANGE UP (ref 0.5–1.3)
CULTURE RESULTS: SIGNIFICANT CHANGE UP
GLUCOSE SERPL-MCNC: 52 MG/DL — LOW (ref 70–115)
METHOD TYPE: SIGNIFICANT CHANGE UP
ORGANISM # SPEC MICROSCOPIC CNT: SIGNIFICANT CHANGE UP
ORGANISM # SPEC MICROSCOPIC CNT: SIGNIFICANT CHANGE UP
POTASSIUM SERPL-MCNC: 4.8 MMOL/L — SIGNIFICANT CHANGE UP (ref 3.5–5.3)
POTASSIUM SERPL-SCNC: 4.8 MMOL/L — SIGNIFICANT CHANGE UP (ref 3.5–5.3)
SODIUM SERPL-SCNC: 141 MMOL/L — SIGNIFICANT CHANGE UP (ref 135–145)
SPECIMEN SOURCE: SIGNIFICANT CHANGE UP

## 2019-01-09 PROCEDURE — 74230 X-RAY XM SWLNG FUNCJ C+: CPT | Mod: 26

## 2019-01-09 RX ORDER — SODIUM CHLORIDE 9 MG/ML
1000 INJECTION, SOLUTION INTRAVENOUS
Qty: 0 | Refills: 0 | Status: DISCONTINUED | OUTPATIENT
Start: 2019-01-09 | End: 2019-01-10

## 2019-01-09 RX ORDER — CEFTRIAXONE 500 MG/1
INJECTION, POWDER, FOR SOLUTION INTRAMUSCULAR; INTRAVENOUS
Qty: 0 | Refills: 0 | Status: DISCONTINUED | OUTPATIENT
Start: 2019-01-09 | End: 2019-01-10

## 2019-01-09 RX ORDER — CEFTRIAXONE 500 MG/1
1 INJECTION, POWDER, FOR SOLUTION INTRAMUSCULAR; INTRAVENOUS EVERY 24 HOURS
Qty: 0 | Refills: 0 | Status: DISCONTINUED | OUTPATIENT
Start: 2019-01-10 | End: 2019-01-10

## 2019-01-09 RX ORDER — ACETAMINOPHEN 500 MG
650 TABLET ORAL EVERY 6 HOURS
Qty: 0 | Refills: 0 | Status: DISCONTINUED | OUTPATIENT
Start: 2019-01-09 | End: 2019-01-11

## 2019-01-09 RX ORDER — SACCHAROMYCES BOULARDII 250 MG
250 POWDER IN PACKET (EA) ORAL
Qty: 0 | Refills: 0 | Status: DISCONTINUED | OUTPATIENT
Start: 2019-01-09 | End: 2019-01-10

## 2019-01-09 RX ORDER — CEFTRIAXONE 500 MG/1
1 INJECTION, POWDER, FOR SOLUTION INTRAMUSCULAR; INTRAVENOUS ONCE
Qty: 0 | Refills: 0 | Status: COMPLETED | OUTPATIENT
Start: 2019-01-09 | End: 2019-01-09

## 2019-01-09 RX ADMIN — Medication 25 MILLIGRAM(S): at 12:15

## 2019-01-09 RX ADMIN — PANTOPRAZOLE SODIUM 40 MILLIGRAM(S): 20 TABLET, DELAYED RELEASE ORAL at 17:38

## 2019-01-09 RX ADMIN — ENOXAPARIN SODIUM 40 MILLIGRAM(S): 100 INJECTION SUBCUTANEOUS at 06:02

## 2019-01-09 RX ADMIN — Medication 650 MILLIGRAM(S): at 12:40

## 2019-01-09 RX ADMIN — SODIUM CHLORIDE 75 MILLILITER(S): 9 INJECTION, SOLUTION INTRAVENOUS at 22:09

## 2019-01-09 RX ADMIN — SODIUM CHLORIDE 150 MILLILITER(S): 9 INJECTION INTRAMUSCULAR; INTRAVENOUS; SUBCUTANEOUS at 03:05

## 2019-01-09 RX ADMIN — SODIUM CHLORIDE 150 MILLILITER(S): 9 INJECTION INTRAMUSCULAR; INTRAVENOUS; SUBCUTANEOUS at 08:04

## 2019-01-09 RX ADMIN — Medication 250 MILLIGRAM(S): at 17:38

## 2019-01-09 RX ADMIN — Medication 1 GRAM(S): at 17:38

## 2019-01-09 RX ADMIN — Medication 650 MILLIGRAM(S): at 22:00

## 2019-01-09 RX ADMIN — Medication 650 MILLIGRAM(S): at 20:58

## 2019-01-09 RX ADMIN — Medication 1 GRAM(S): at 06:02

## 2019-01-09 RX ADMIN — Medication 1 GRAM(S): at 23:08

## 2019-01-09 RX ADMIN — PANTOPRAZOLE SODIUM 40 MILLIGRAM(S): 20 TABLET, DELAYED RELEASE ORAL at 06:02

## 2019-01-09 RX ADMIN — Medication 650 MILLIGRAM(S): at 13:45

## 2019-01-09 RX ADMIN — CEFTRIAXONE 100 GRAM(S): 500 INJECTION, POWDER, FOR SOLUTION INTRAMUSCULAR; INTRAVENOUS at 13:31

## 2019-01-09 RX ADMIN — Medication 1 GRAM(S): at 12:15

## 2019-01-09 RX ADMIN — SODIUM CHLORIDE 150 MILLILITER(S): 9 INJECTION INTRAMUSCULAR; INTRAVENOUS; SUBCUTANEOUS at 17:46

## 2019-01-09 RX ADMIN — Medication 125 MICROGRAM(S): at 06:02

## 2019-01-09 NOTE — SWALLOW VFSS/MBS ASSESSMENT ADULT - RESIDUE IN VALLECULAE
Cleared with subsequent swallows./Trace Cleared with subsequent swallow./Trace Cleared with subsequent swallow/Trace

## 2019-01-09 NOTE — PROGRESS NOTE ADULT - PROBLEM SELECTOR PLAN 3
may be secondary to cns disorder, f/u as outpt; after mod barium swallow, S/T recommends reg diet w/thin liguids, f/u GI for globus sensation; pt prefers to use private GI MD not affiliated with [facility]; adv diet

## 2019-01-09 NOTE — SWALLOW VFSS/MBS ASSESSMENT ADULT - RECOMMENDED FEEDING/EATING TECHNIQUES
alternate food with liquid/small sips/bites/maintain upright posture during/after eating for 30 mins/position upright (90 degrees)/crush medication (when feasible)/no straws/oral hygiene

## 2019-01-09 NOTE — SWALLOW VFSS/MBS ASSESSMENT ADULT - NS SWALLOW VFSS REC ASPIR MON
change of breathing pattern/cough/gurgly voice/oral hygiene/fever/pneumonia/upper respiratory infection

## 2019-01-09 NOTE — SWALLOW VFSS/MBS ASSESSMENT ADULT - ROSENBEK'S PENETRATION ASPIRATION SCALE
(1) no aspiration, contrast does not enter airway (3) contrast remains above the vocal cords, visible residue remains (penetration)/in 2/6 trials, during consecutive cup sips.  1 = no penetration/aspiration observed with single cup sips

## 2019-01-09 NOTE — SWALLOW VFSS/MBS ASSESSMENT ADULT - SLP PERTINENT HISTORY OF CURRENT PROBLEM
As per h&p:  "presents with 2 week history of not feeling well. She was fine before December and suffered from Colitis all through the month for which she has been having Mesalamine and Imodium intermittently. She has frequent multiple small bowel movement which are occasionally bloody which she attributes to her internal hemorrhoids. She states she cannot swallow and also has feeling of something stuck in throat and states she had a thyroid sonogram recently. Occasional reflux and frequent burping. Denies any odynophagia."Pt seen at bedside 1/8/18, pt reporting globus sensation with PO, Rx NPO and MBS recommended for objective view of pharyngeal stage.

## 2019-01-09 NOTE — PROGRESS NOTE ADULT - SUBJECTIVE AND OBJECTIVE BOX
SUBJECTIVE    REVIEW OF SYSTEMS    General: Not in any pain	    Skin/Breast: No rash  	  ENMT: No visual problems, no sore throat	    Respiratory and Thorax: No cough, No CP, No SOB  	  Cardiovascular: No CP, No palpitations    Gastrointestinal: No Abd pain, No N/V/D    Musculoskeletal: No Joint pain, No back pain	    Neurological: No headache    Psychiatric: No anxiety      OBJECTIVE    Vital Signs Last 24 Hrs  T(C): 36.3 (01-09-19 @ 21:01), Max: 36.6 (01-09-19 @ 05:10)  T(F): 97.4 (01-09-19 @ 21:01), Max: 97.8 (01-09-19 @ 05:10)  HR: 67 (01-09-19 @ 21:01) (62 - 69)  BP: 103/61 (01-09-19 @ 21:01) (103/61 - 122/75)  BP(mean): --  RR: 18 (01-09-19 @ 21:01) (18 - 20)  SpO2: 98% (01-09-19 @ 21:01) (95% - 98%)    PHYSICAL EXAM:    Constitutional: Not in any distress    Eyes: No conjunctival injection    ENMT: No oral lesions    Neck: No nodes, no adenopathy    Back: Straight, no defects    Respiratory: clear b/l    Cardiovascular: RRR, no murmur    Gastrointestinal: soft, NT, ND    Extremities: No edema, no erythema    Neurological: no focal deficit    Skin: No rash      MEDICATIONS  (STANDING):  ALBUTerol    90 MICROgram(s) HFA Inhaler 1 Puff(s) Inhalation every 4 hours  cefTRIAXone   IVPB      dextrose 5% + sodium chloride 0.9%. 1000 milliLiter(s) (75 mL/Hr) IV Continuous <Continuous>  enoxaparin Injectable 40 milliGRAM(s) SubCutaneous every 24 hours  levothyroxine 125 MICROGram(s) Oral daily  pantoprazole    Tablet 40 milliGRAM(s) Oral every 12 hours  saccharomyces boulardii 250 milliGRAM(s) Oral two times a day  sucralfate 1 Gram(s) Oral four times a day    MEDICATIONS  (PRN):  acetaminophen   Tablet .. 650 milliGRAM(s) Oral every 6 hours PRN Moderate Pain (4 - 6)  meclizine 25 milliGRAM(s) Oral every 6 hours PRN Dizziness          09 Jan 2019 07:46    141    |  108    |  11.0   ----------------------------<  52     4.8     |  16.0   |  0.67     Ca    8.4        09 Jan 2019 07:46      Allergies    Sulfacetamide Sodium (Hives)    Intolerances

## 2019-01-09 NOTE — SWALLOW VFSS/MBS ASSESSMENT ADULT - DIAGNOSTIC IMPRESSIONS
Oral stage functional, mild pharyngeal dysphagia with thin liquids marked by reduced laryngeal elevation, reduced airway closure, +silent penetration above the vocal cords without clearance in 2/6 trials during consecutive cup sips.  Pt reports globus sensation after swallow with all PO after each trial, however, no stasis observed in pharynx even after stasis cleared with subsequent swallow with mechanical soft, soft and solid.

## 2019-01-09 NOTE — SWALLOW VFSS/MBS ASSESSMENT ADULT - ESOPHAGEAL STAGE
Pt reports globus sensation after swallow with each trial, however, no stasis observed in pharynx. Pt reports globus sensation after swallow with each trial, however, no stasis observed in pharynx after stasis cleared with subsequent swallow. Pt reports globus sensation after swallow with each trial, however, no stasis observed in pharynx after stasis cleared with subsequent swallow.  Pt additionally reported "needing to gag".

## 2019-01-10 ENCOUNTER — TRANSCRIPTION ENCOUNTER (OUTPATIENT)
Age: 69
End: 2019-01-10

## 2019-01-10 LAB
ANION GAP SERPL CALC-SCNC: 10 MMOL/L — SIGNIFICANT CHANGE UP (ref 5–17)
BUN SERPL-MCNC: 8 MG/DL — SIGNIFICANT CHANGE UP (ref 8–20)
CALCIUM SERPL-MCNC: 8.2 MG/DL — LOW (ref 8.6–10.2)
CHLORIDE SERPL-SCNC: 111 MMOL/L — HIGH (ref 98–107)
CO2 SERPL-SCNC: 21 MMOL/L — LOW (ref 22–29)
CREAT SERPL-MCNC: 0.66 MG/DL — SIGNIFICANT CHANGE UP (ref 0.5–1.3)
GLUCOSE SERPL-MCNC: 118 MG/DL — HIGH (ref 70–115)
POTASSIUM SERPL-MCNC: 3.8 MMOL/L — SIGNIFICANT CHANGE UP (ref 3.5–5.3)
POTASSIUM SERPL-SCNC: 3.8 MMOL/L — SIGNIFICANT CHANGE UP (ref 3.5–5.3)
SODIUM SERPL-SCNC: 142 MMOL/L — SIGNIFICANT CHANGE UP (ref 135–145)

## 2019-01-10 RX ADMIN — Medication 250 MILLIGRAM(S): at 17:50

## 2019-01-10 RX ADMIN — Medication 1 GRAM(S): at 05:38

## 2019-01-10 RX ADMIN — ENOXAPARIN SODIUM 40 MILLIGRAM(S): 100 INJECTION SUBCUTANEOUS at 05:38

## 2019-01-10 RX ADMIN — Medication 650 MILLIGRAM(S): at 05:39

## 2019-01-10 RX ADMIN — Medication 650 MILLIGRAM(S): at 14:30

## 2019-01-10 RX ADMIN — SODIUM CHLORIDE 75 MILLILITER(S): 9 INJECTION, SOLUTION INTRAVENOUS at 09:25

## 2019-01-10 RX ADMIN — Medication 250 MILLIGRAM(S): at 05:38

## 2019-01-10 RX ADMIN — Medication 1 GRAM(S): at 13:27

## 2019-01-10 RX ADMIN — PANTOPRAZOLE SODIUM 40 MILLIGRAM(S): 20 TABLET, DELAYED RELEASE ORAL at 05:38

## 2019-01-10 RX ADMIN — Medication 1 GRAM(S): at 23:44

## 2019-01-10 RX ADMIN — Medication 650 MILLIGRAM(S): at 13:27

## 2019-01-10 RX ADMIN — Medication 650 MILLIGRAM(S): at 20:39

## 2019-01-10 RX ADMIN — Medication 650 MILLIGRAM(S): at 06:39

## 2019-01-10 RX ADMIN — Medication 1 GRAM(S): at 17:50

## 2019-01-10 RX ADMIN — Medication 650 MILLIGRAM(S): at 21:39

## 2019-01-10 RX ADMIN — PANTOPRAZOLE SODIUM 40 MILLIGRAM(S): 20 TABLET, DELAYED RELEASE ORAL at 17:50

## 2019-01-10 RX ADMIN — Medication 125 MICROGRAM(S): at 05:39

## 2019-01-10 NOTE — DISCHARGE NOTE ADULT - CARE PROVIDER_API CALL
Lupillo Allred), Family Medicine  [address] [place] [address]  Phone: [phone]  Fax: [phone]    Scar Ruiz), Neurology; Vascular Neurology  [address] [facility]  [address]  [place] [address]  Phone: [phone]  Fax: [phone]

## 2019-01-10 NOTE — DISCHARGE NOTE ADULT - HOSPITAL COURSE
67 yo female presented with dizzyness  Admitted for dehydration and r/o cva  CT brain, MRI, MRA, Echo normal  neuro eval suspects early parkinson  found to have uti and dysphagia  swallow tests normal; condition improved, for d/c home

## 2019-01-10 NOTE — DISCHARGE NOTE ADULT - MEDICATION SUMMARY - MEDICATIONS TO STOP TAKING
I will STOP taking the medications listed below when I get home from the hospital:    NexIUM 40 mg oral delayed release capsule  -- 1 cap(s) by mouth once a day    acetaminophen-oxyCODONE 325 mg-5 mg oral tablet  -- 1 tab(s) by mouth every 6 hours, As needed, Moderate Pain    bacitracin/neomycin/polymyxin B 400 units-3.5 mg-5000 units/g topical ointment  -- 1 application on skin 2 times a day    saccharomyces boulardii lyo 250 mg oral capsule  -- 2 cap(s) by mouth 2 times a day    loratadine 10 mg oral tablet  -- 1 tab(s) by mouth once a day    cephalexin 500 mg oral capsule  -- 1 cap(s) by mouth every 12 hours    mesalamine 1.2 g oral delayed release tablet  -- 3 tab(s) by mouth once a day

## 2019-01-10 NOTE — DISCHARGE NOTE ADULT - CARE PROVIDERS DIRECT ADDRESSES
,DirectAddress_Unknown,[email or web address]

## 2019-01-10 NOTE — DISCHARGE NOTE ADULT - MEDICATION SUMMARY - MEDICATIONS TO TAKE
I will START or STAY ON the medications listed below when I get home from the hospital:    Valium 5 mg oral tablet  -- 1 tab(s) by mouth once a day, As Needed  -- Indication: For anxiety    levothyroxine 125 mcg (0.125 mg) oral tablet  -- 1 tab(s) by mouth once a day  -- Indication: For hypothyroi

## 2019-01-10 NOTE — DISCHARGE NOTE ADULT - PATIENT PORTAL LINK FT
You can access the [facility] Patient Portal, offered by [facility], by registering with the following website: [email or web address]

## 2019-01-10 NOTE — PROGRESS NOTE ADULT - PROBLEM SELECTOR PLAN 1
improved; likely secondary to early parkinsons, cont p/t, f/u outpt neuro; d/c iv fluids, advance diet

## 2019-01-10 NOTE — PROGRESS NOTE ADULT - SUBJECTIVE AND OBJECTIVE BOX
SUBJECTIVE    REVIEW OF SYSTEMS    General: Not in any pain	    Skin/Breast: No rash  	  ENMT: No visual problems, no sore throat	    Respiratory and Thorax: No cough, No CP, No SOB  	  Cardiovascular: No CP, No palpitations    Gastrointestinal: No Abd pain, No N/V/D    Musculoskeletal: No Joint pain, No back pain	    Neurological: No headache    Psychiatric: No anxiety      OBJECTIVE    Vital Signs Last 24 Hrs  T(C): 36.4 (01-10-19 @ 16:05), Max: 36.6 (01-10-19 @ 04:29)  T(F): 97.5 (01-10-19 @ 16:05), Max: 97.9 (01-10-19 @ 04:29)  HR: 67 (01-10-19 @ 16:05) (58 - 67)  BP: 112/73 (01-10-19 @ 16:05) (103/61 - 122/79)  BP(mean): --  RR: 18 (01-10-19 @ 10:55) (18 - 18)  SpO2: 98% (01-09-19 @ 21:01) (98% - 98%)    PHYSICAL EXAM:    Constitutional: Not in any distress    Eyes: No conjunctival injection    ENMT: No oral lesions    Neck: No nodes, no adenopathy    Back: Straight, no defects    Respiratory: clear b/l    Cardiovascular: RRR, no murmur    Gastrointestinal: soft, NT, ND    Extremities: No edema, no erythema    Neurological: no focal deficit    Skin: No rash      MEDICATIONS  (STANDING):  ALBUTerol    90 MICROgram(s) HFA Inhaler 1 Puff(s) Inhalation every 4 hours  enoxaparin Injectable 40 milliGRAM(s) SubCutaneous every 24 hours  levothyroxine 125 MICROGram(s) Oral daily  pantoprazole    Tablet 40 milliGRAM(s) Oral every 12 hours  sucralfate 1 Gram(s) Oral four times a day    MEDICATIONS  (PRN):  acetaminophen   Tablet .. 650 milliGRAM(s) Oral every 6 hours PRN Moderate Pain (4 - 6)  meclizine 25 milliGRAM(s) Oral every 6 hours PRN Dizziness          10 Guilherme 2019 08:08    142    |  111    |  8.0    ----------------------------<  118    3.8     |  21.0   |  0.66     Ca    8.2        10 Guilherme 2019 08:08      Allergies    Sulfacetamide Sodium (Hives)    Intolerances

## 2019-01-10 NOTE — DISCHARGE NOTE ADULT - CARE PLAN
Principal Discharge DX:	Dehydration  Goal:	home  Assessment and plan of treatment:	regular diet  Secondary Diagnosis:	Dizzy  Secondary Diagnosis:	UTI (urinary tract infection)

## 2019-01-10 NOTE — PROGRESS NOTE ADULT - REASON FOR ADMISSION
very weak and dizzy x 2 weeks

## 2019-01-11 VITALS
TEMPERATURE: 98 F | HEART RATE: 61 BPM | SYSTOLIC BLOOD PRESSURE: 111 MMHG | DIASTOLIC BLOOD PRESSURE: 74 MMHG | RESPIRATION RATE: 16 BRPM

## 2019-01-11 PROCEDURE — 99285 EMERGENCY DEPT VISIT HI MDM: CPT | Mod: 25

## 2019-01-11 PROCEDURE — 92611 MOTION FLUOROSCOPY/SWALLOW: CPT

## 2019-01-11 PROCEDURE — 81001 URINALYSIS AUTO W/SCOPE: CPT

## 2019-01-11 PROCEDURE — 70544 MR ANGIOGRAPHY HEAD W/O DYE: CPT

## 2019-01-11 PROCEDURE — 85730 THROMBOPLASTIN TIME PARTIAL: CPT

## 2019-01-11 PROCEDURE — 96361 HYDRATE IV INFUSION ADD-ON: CPT

## 2019-01-11 PROCEDURE — 84484 ASSAY OF TROPONIN QUANT: CPT

## 2019-01-11 PROCEDURE — 87486 CHLMYD PNEUM DNA AMP PROBE: CPT

## 2019-01-11 PROCEDURE — 83880 ASSAY OF NATRIURETIC PEPTIDE: CPT

## 2019-01-11 PROCEDURE — 92610 EVALUATE SWALLOWING FUNCTION: CPT

## 2019-01-11 PROCEDURE — 87186 SC STD MICRODIL/AGAR DIL: CPT

## 2019-01-11 PROCEDURE — 96375 TX/PRO/DX INJ NEW DRUG ADDON: CPT

## 2019-01-11 PROCEDURE — 85652 RBC SED RATE AUTOMATED: CPT

## 2019-01-11 PROCEDURE — 97530 THERAPEUTIC ACTIVITIES: CPT

## 2019-01-11 PROCEDURE — 80048 BASIC METABOLIC PNL TOTAL CA: CPT

## 2019-01-11 PROCEDURE — 84443 ASSAY THYROID STIM HORMONE: CPT

## 2019-01-11 PROCEDURE — 93306 TTE W/DOPPLER COMPLETE: CPT

## 2019-01-11 PROCEDURE — 36415 COLL VENOUS BLD VENIPUNCTURE: CPT

## 2019-01-11 PROCEDURE — 87633 RESP VIRUS 12-25 TARGETS: CPT

## 2019-01-11 PROCEDURE — 74230 X-RAY XM SWLNG FUNCJ C+: CPT

## 2019-01-11 PROCEDURE — 87798 DETECT AGENT NOS DNA AMP: CPT

## 2019-01-11 PROCEDURE — 80061 LIPID PANEL: CPT

## 2019-01-11 PROCEDURE — 71045 X-RAY EXAM CHEST 1 VIEW: CPT

## 2019-01-11 PROCEDURE — 74177 CT ABD & PELVIS W/CONTRAST: CPT

## 2019-01-11 PROCEDURE — 83036 HEMOGLOBIN GLYCOSYLATED A1C: CPT

## 2019-01-11 PROCEDURE — 96365 THER/PROPH/DIAG IV INF INIT: CPT | Mod: XU

## 2019-01-11 PROCEDURE — 93005 ELECTROCARDIOGRAM TRACING: CPT

## 2019-01-11 PROCEDURE — 97116 GAIT TRAINING THERAPY: CPT

## 2019-01-11 PROCEDURE — 85610 PROTHROMBIN TIME: CPT

## 2019-01-11 PROCEDURE — 82550 ASSAY OF CK (CPK): CPT

## 2019-01-11 PROCEDURE — 87581 M.PNEUMON DNA AMP PROBE: CPT

## 2019-01-11 PROCEDURE — 70450 CT HEAD/BRAIN W/O DYE: CPT

## 2019-01-11 PROCEDURE — 94640 AIRWAY INHALATION TREATMENT: CPT

## 2019-01-11 PROCEDURE — 80053 COMPREHEN METABOLIC PANEL: CPT

## 2019-01-11 PROCEDURE — 84436 ASSAY OF TOTAL THYROXINE: CPT

## 2019-01-11 PROCEDURE — 70551 MRI BRAIN STEM W/O DYE: CPT

## 2019-01-11 PROCEDURE — 87086 URINE CULTURE/COLONY COUNT: CPT

## 2019-01-11 PROCEDURE — 70548 MR ANGIOGRAPHY NECK W/DYE: CPT

## 2019-01-11 PROCEDURE — 85027 COMPLETE CBC AUTOMATED: CPT

## 2019-01-11 PROCEDURE — 72141 MRI NECK SPINE W/O DYE: CPT

## 2019-01-11 RX ORDER — MESALAMINE 400 MG
3 TABLET, DELAYED RELEASE (ENTERIC COATED) ORAL
Qty: 0 | Refills: 0 | COMMUNITY

## 2019-01-11 RX ADMIN — Medication 650 MILLIGRAM(S): at 12:00

## 2019-01-11 RX ADMIN — PANTOPRAZOLE SODIUM 40 MILLIGRAM(S): 20 TABLET, DELAYED RELEASE ORAL at 05:49

## 2019-01-11 RX ADMIN — ENOXAPARIN SODIUM 40 MILLIGRAM(S): 100 INJECTION SUBCUTANEOUS at 05:49

## 2019-01-11 RX ADMIN — Medication 125 MICROGRAM(S): at 05:49

## 2019-01-11 RX ADMIN — Medication 650 MILLIGRAM(S): at 05:50

## 2019-01-11 RX ADMIN — Medication 1 GRAM(S): at 05:49

## 2019-01-11 RX ADMIN — Medication 1 GRAM(S): at 11:59

## 2019-01-16 ENCOUNTER — APPOINTMENT (OUTPATIENT)
Dept: NEUROLOGY | Facility: CLINIC | Age: 69
End: 2019-01-16
Payer: MEDICARE

## 2019-01-16 VITALS
SYSTOLIC BLOOD PRESSURE: 112 MMHG | BODY MASS INDEX: 23.39 KG/M2 | WEIGHT: 137 LBS | DIASTOLIC BLOOD PRESSURE: 70 MMHG | HEIGHT: 64 IN

## 2019-01-16 DIAGNOSIS — G44.89 OTHER HEADACHE SYNDROME: ICD-10-CM

## 2019-01-16 DIAGNOSIS — R25.1 TREMOR, UNSPECIFIED: ICD-10-CM

## 2019-01-16 DIAGNOSIS — R42 DIZZINESS AND GIDDINESS: ICD-10-CM

## 2019-01-16 PROCEDURE — 99214 OFFICE O/P EST MOD 30 MIN: CPT

## 2019-01-16 RX ORDER — DIAZEPAM 10 MG/1
10 TABLET ORAL
Qty: 30 | Refills: 0 | Status: COMPLETED | COMMUNITY
Start: 2017-12-05 | End: 2019-01-16

## 2019-01-16 RX ORDER — PREDNISONE 20 MG/1
20 TABLET ORAL
Qty: 7 | Refills: 0 | Status: COMPLETED | COMMUNITY
Start: 2017-11-22 | End: 2019-01-16

## 2019-01-16 RX ORDER — LOPERAMIDE HCL 2 MG
2 CAPSULE ORAL
Refills: 0 | Status: ACTIVE | COMMUNITY

## 2019-01-16 RX ORDER — TRAMADOL HYDROCHLORIDE 50 MG/1
50 TABLET, COATED ORAL
Qty: 21 | Refills: 0 | Status: COMPLETED | COMMUNITY
Start: 2017-11-13 | End: 2019-01-16

## 2019-01-16 NOTE — HISTORY OF PRESENT ILLNESS
[FreeTextEntry1] : The patient in the office today.\par \par I had initially seen her in [facility] in early January of 2019.\par She had presented with headache and dizziness.\par She also had reported swallowing difficulty.\par Diagnostic workup was unrevealing.\par \par She also described tremor.\par She is concerned about the possibility of Parkinson's disease.\par She reports that the tremors have been going on since the middle of 2018.\par Initially it was with her on a daily basis, but now she notices it intermittently.\par The right is much worse on the left.\par It is more prominent at rest and with intention.\par She denies any modifying factors.\par

## 2019-01-16 NOTE — DATA REVIEWED
[de-identified] : Brain MRI was performed on 1/7/19.\par The study was unremarkable.\par \par MRA of the head and neck was performed on the same date.\par The studies were unremarkable.\par \par MRI of the cervical spine was performed on the same date.\par The study demonstrated some reversal of the normal cervical curve in addition to mild degenerative changes. There was no evidence of cord compression or signal abnormality. [de-identified] : Sedimentation rate was 12.

## 2019-01-16 NOTE — ASSESSMENT
[FreeTextEntry1] : This is a 68-year-old woman who has been having some dizziness and headache recently. This has subsided though not resolved. She also has swallowing difficulty.\par \par She has been having tremors for the past 6 months or so.\par She has a family history of Parkinson's disease.\par I would like to obtain a dopamine spectroscopy scan to assess for abnormal uptake in the basal ganglia.\par \par I will see her back afterward.\par If the study is normal then I would recommend she go for ENT evaluation for swallowing difficulty and dizziness.

## 2019-01-16 NOTE — PHYSICAL EXAM
[General Appearance - Alert] : alert [General Appearance - In No Acute Distress] : in no acute distress [Oriented To Time, Place, And Person] : oriented to person, place, and time [Affect] : the affect was normal [Memory Recent] : recent memory was not impaired [Memory Remote] : remote memory was not impaired [Cranial Nerves Optic (II)] : visual acuity intact bilaterally,  visual fields full to confrontation, pupils equal round and reactive to light [Cranial Nerves Oculomotor (III)] : extraocular motion intact [Cranial Nerves Trigeminal (V)] : facial sensation intact symmetrically [Cranial Nerves Facial (VII)] : face symmetrical [Cranial Nerves Vestibulocochlear (VIII)] : hearing was intact bilaterally [Cranial Nerves Glossopharyngeal (IX)] : tongue and palate midline [Cranial Nerves Accessory (XI - Cranial And Spinal)] : head turning and shoulder shrug symmetric [Cranial Nerves Hypoglossal (XII)] : there was no tongue deviation with protrusion [Motor Tone] : muscle tone was normal in all four extremities [Motor Strength] : muscle strength was normal in all four extremities [Sensation Tactile Decrease] : light touch was intact [Sensation Pain / Temperature Decrease] : pain and temperature was intact [Sensation Vibration Decrease] : vibration was intact [Abnormal Walk] : normal gait [Tremor] : a tremor present [2+] : Patella left 2+ [Optic Disc Abnormality] : the optic disc were normal in size and color [Edema] : there was no peripheral edema [Dysarthria] : no dysarthria [Aphasia] : no dysphasia/aphasia [Romberg's Sign] : Romberg's sign was negtive [Coordination - Dysmetria Impaired Finger-to-Nose Bilateral] : not present [Plantar Reflex Right Only] : normal on the right [Plantar Reflex Left Only] : normal on the left [FreeTextEntry1] : There is no tenderness over the temporal arteries.

## 2019-01-16 NOTE — REVIEW OF SYSTEMS
[Feeling Tired] : feeling tired [As Noted in HPI] : as noted in HPI [Eye Pain] : eye pain [Palpitations] : palpitations [Shortness Of Breath] : shortness of breath [Abdominal Pain] : abdominal pain [Vomiting] : vomiting [Diarrhea] : diarrhea [Nocturia] : nocturia [Negative] : Heme/Lymph

## 2019-01-21 ENCOUNTER — FORM ENCOUNTER (OUTPATIENT)
Age: 69
End: 2019-01-21

## 2019-01-22 ENCOUNTER — APPOINTMENT (OUTPATIENT)
Dept: NUCLEAR MEDICINE | Facility: CLINIC | Age: 69
End: 2019-01-22
Payer: MEDICARE

## 2019-01-22 ENCOUNTER — OUTPATIENT (OUTPATIENT)
Dept: OUTPATIENT SERVICES | Facility: HOSPITAL | Age: 69
LOS: 1 days | End: 2019-01-22

## 2019-01-22 DIAGNOSIS — R25.1 TREMOR, UNSPECIFIED: ICD-10-CM

## 2019-01-22 DIAGNOSIS — Z98.89 OTHER SPECIFIED POSTPROCEDURAL STATES: Chronic | ICD-10-CM

## 2019-01-22 PROCEDURE — 78607: CPT | Mod: 26

## 2019-02-05 ENCOUNTER — APPOINTMENT (OUTPATIENT)
Dept: DERMATOLOGY | Facility: CLINIC | Age: 69
End: 2019-02-05

## 2019-02-13 ENCOUNTER — OUTPATIENT (OUTPATIENT)
Dept: OUTPATIENT SERVICES | Facility: HOSPITAL | Age: 69
LOS: 1 days | End: 2019-02-13
Payer: MEDICARE

## 2019-02-13 DIAGNOSIS — Z98.89 OTHER SPECIFIED POSTPROCEDURAL STATES: Chronic | ICD-10-CM

## 2019-02-13 DIAGNOSIS — Z51.89 ENCOUNTER FOR OTHER SPECIFIED AFTERCARE: ICD-10-CM

## 2019-02-13 DIAGNOSIS — G20 PARKINSON'S DISEASE: ICD-10-CM

## 2019-02-13 DIAGNOSIS — R27.9 UNSPECIFIED LACK OF COORDINATION: ICD-10-CM

## 2019-02-21 PROCEDURE — 97163 PT EVAL HIGH COMPLEX 45 MIN: CPT

## 2019-02-21 PROCEDURE — 97112 NEUROMUSCULAR REEDUCATION: CPT

## 2019-02-21 PROCEDURE — 97116 GAIT TRAINING THERAPY: CPT

## 2019-02-21 PROCEDURE — 97530 THERAPEUTIC ACTIVITIES: CPT

## 2019-02-21 PROCEDURE — 97110 THERAPEUTIC EXERCISES: CPT

## 2019-02-27 ENCOUNTER — TRANSCRIPTION ENCOUNTER (OUTPATIENT)
Age: 69
End: 2019-02-27

## 2019-03-07 ENCOUNTER — APPOINTMENT (OUTPATIENT)
Dept: NEUROLOGY | Facility: CLINIC | Age: 69
End: 2019-03-07

## 2019-03-26 ENCOUNTER — APPOINTMENT (OUTPATIENT)
Dept: DERMATOLOGY | Facility: CLINIC | Age: 69
End: 2019-03-26

## 2019-04-04 ENCOUNTER — APPOINTMENT (OUTPATIENT)
Dept: DERMATOLOGY | Facility: CLINIC | Age: 69
End: 2019-04-04
Payer: MEDICARE

## 2019-04-04 PROCEDURE — 99213 OFFICE O/P EST LOW 20 MIN: CPT | Mod: 25

## 2019-04-04 PROCEDURE — 17003 DESTRUCT PREMALG LES 2-14: CPT

## 2019-04-04 PROCEDURE — 17000 DESTRUCT PREMALG LESION: CPT

## 2019-04-18 ENCOUNTER — APPOINTMENT (OUTPATIENT)
Dept: NEUROLOGY | Facility: CLINIC | Age: 69
End: 2019-04-18
Payer: MEDICARE

## 2019-04-18 VITALS — SYSTOLIC BLOOD PRESSURE: 115 MMHG | DIASTOLIC BLOOD PRESSURE: 78 MMHG | HEART RATE: 72 BPM | OXYGEN SATURATION: 99 %

## 2019-04-18 VITALS
HEIGHT: 64 IN | WEIGHT: 142 LBS | DIASTOLIC BLOOD PRESSURE: 80 MMHG | SYSTOLIC BLOOD PRESSURE: 127 MMHG | HEART RATE: 67 BPM | BODY MASS INDEX: 24.24 KG/M2 | TEMPERATURE: 97.4 F | OXYGEN SATURATION: 99 %

## 2019-04-18 PROCEDURE — 99214 OFFICE O/P EST MOD 30 MIN: CPT

## 2019-04-18 NOTE — ASSESSMENT
[FreeTextEntry1] : 69 yo Rh woman, with family h/o of PD (mother had PD with autonomic dysfunction) with PD symptoms since 2016, with loss of dexterity, changes in handwriting, and more recently tremor in the right hand.\par She was diagnosed with PD after a MRI and Muna Scan done a few months ago in early 2019, and she has been on Sinemet 25/100 3 x day for less than 3 months.\par \par On exam there is evidence of mild PD (she is however medicated).\par Would continue current regimen now, may consider adding Azilect, or changing to Stalevo in the future.\par \par .Discussed strategies for better life in PD, including diet and exercise. referred to PF web site.\par Encouraged to increase exercise and physical activity and maintain an active social and intellectual life.\par \par More than 50% of the visit were dedicated to review of treatment, therapeutic plan, and prognosis, and patient was counseled on coping and physical and emotional wellbeing.\par
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
## 2019-04-18 NOTE — PHYSICAL EXAM
[General Appearance - In No Acute Distress] : in no acute distress [General Appearance - Alert] : alert [General Appearance - Well Nourished] : well nourished [General Appearance - Well Developed] : well developed [General Appearance - Well-Appearing] : healthy appearing [] : normal voice and communication [Oriented To Time, Place, And Person] : oriented to person, place, and time [Impaired Insight] : insight and judgment were intact [Affect] : the affect was normal [Mood] : the mood was normal [Place] : oriented to place [Person] : oriented to person [Time] : oriented to time [Short Term Intact] : short term memory intact [Registration Intact] : recent registration memory intact [Remote Intact] : remote memory intact [Comprehension] : comprehension intact [Fluency] : fluency intact [Cranial Nerves Oculomotor (III)] : extraocular motion intact [Cranial Nerves Trigeminal (V)] : facial sensation intact symmetrically [Cranial Nerves Optic (II)] : visual acuity intact bilaterally,  visual fields full to confrontation, pupils equal round and reactive to light [Cranial Nerves Vestibulocochlear (VIII)] : hearing was intact bilaterally [Cranial Nerves Facial (VII)] : face symmetrical [Cranial Nerves Accessory (XI - Cranial And Spinal)] : head turning and shoulder shrug symmetric [Cranial Nerves Glossopharyngeal (IX)] : tongue and palate midline [Motor Strength] : muscle strength was normal in all four extremities [Cranial Nerves Hypoglossal (XII)] : there was no tongue deviation with protrusion [Involuntary Movements] : no involuntary movements were seen [No Muscle Atrophy] : normal bulk in all four extremities [Motor Handedness Right-Handed] : the patient is right hand dominant [Sensation Pain / Temperature Decrease] : pain and temperature was intact [Sensation Tactile Decrease] : light touch was intact [1+] : Brachioradialis left 1+ [2+] : Ankle jerk left 2+ [FreeTextEntry1] : Patient with normal cognitive function, mild hypomimia with diminished blinking, mild reduction of voice volume.\par she has mild rigidity with some cogwheeling, present bilaterally, more evident on the right side.\par Mild intermittent tremor is present in the right hand, and occasionally in the left.\par There is mild loss of motor dexterity, with decrement at rapid sequential and rapid alternating movements, also slightly more evident on the right side.\par Foot tapping is slightly impaired, also with the right side more affected.\par Posture is mildly stooped, with some shortening of gait stride and mild asymmetry with right reduction.\par Postural reflexes are normal.\par  [Paresis Pronator Drift Left-Sided] : no pronator drift on the left [Paresis Pronator Drift Right-Sided] : no pronator drift on the right [Motor Strength Upper Extremities Bilaterally] : strength was normal in both upper extremities [Motor Strength Lower Extremities Bilaterally] : strength was normal in both lower extremities [Dysesthesia] : no dysesthesia [Hyperesthesia] : no hyperesthesia [Dysdiadochokinesia Bilaterally] : not present [Coordination - Dysmetria Impaired Finger-to-Nose Bilateral] : not present [Plantar Reflex Right Only] : normal on the right [FreeTextEntry5] : mild eyelid tremor [Plantar Reflex Left Only] : normal on the left

## 2019-04-22 ENCOUNTER — TRANSCRIPTION ENCOUNTER (OUTPATIENT)
Age: 69
End: 2019-04-22

## 2019-04-25 ENCOUNTER — APPOINTMENT (OUTPATIENT)
Dept: SURGICAL ONCOLOGY | Facility: CLINIC | Age: 69
End: 2019-04-25
Payer: MEDICARE

## 2019-04-25 VITALS
HEIGHT: 64 IN | BODY MASS INDEX: 24.24 KG/M2 | HEART RATE: 65 BPM | WEIGHT: 142 LBS | SYSTOLIC BLOOD PRESSURE: 102 MMHG | DIASTOLIC BLOOD PRESSURE: 69 MMHG

## 2019-04-25 DIAGNOSIS — Z08 ENCOUNTER FOR FOLLOW-UP EXAMINATION AFTER COMPLETED TREATMENT FOR MALIGNANT NEOPLASM: ICD-10-CM

## 2019-04-25 DIAGNOSIS — Z85.820 ENCOUNTER FOR FOLLOW-UP EXAMINATION AFTER COMPLETED TREATMENT FOR MALIGNANT NEOPLASM: ICD-10-CM

## 2019-04-25 PROCEDURE — 99214 OFFICE O/P EST MOD 30 MIN: CPT

## 2019-04-25 RX ORDER — LEVOTHYROXINE SODIUM 125 UG/1
125 TABLET ORAL DAILY
Refills: 0 | Status: ACTIVE | COMMUNITY

## 2019-04-25 NOTE — ASSESSMENT
[FreeTextEntry1] : 68 year old female presents for a melanoma follow up visit. She underwent a shave biopsy of a suspicious right forearm lesion which revealed a T1 (0.4 mm) melanoma. She is now s/p wide excision of a right  right lateral forearm melanoma. Final pathology revealed residual melanoma in situ and scar secondary to prior procedure, completely excised. Maximum tumor thickness measuring 0.4 mm, no ulceration, regression or lymphovascular invasion. Tumor stage:  pT1a. \par \par 4/25/19-  Continues f/u with dermatology. States she was hospitalized in 1/2019 for difficulty walking and swallowing, newly diagnosed with Parkinsons' Disease.   States she was started on a Mediterranean diet, PT and exercise 2-3 hours per day. Denies new skin lesions or masses. No bleeding or pruritic moles. No evidence of local or regional recurrence. \par \par PLAN:\par 1) RTO Q6 months\par 2) Continue dermatologic surveillance with Dr. Campbell

## 2019-04-25 NOTE — HISTORY OF PRESENT ILLNESS
[de-identified] : Mrs. Vaughn is a 68 year old female presents for a 4 month melanoma follow up visit. \par She was initially referred by Dr. Rambo Campbell ([place]) on 3/8/18.\par She reports she noticed a changing right lateral forearm mole x 9 months and sought care with a dermatologist. \par \par She is s/p right distal dorsal forearm shave biopsy performed on 2/6/18. \par \par Final pathology revealed MALIGNANT MELANOMA, invasive to 0.4 mm, pT1a, closely approaching peripheral margin. No definite mitotic figures, ulceration or regression changes. \par \par Denies any other concerning lesions or masses. Denies bleeding or pruritic moles.  Denies pain or constitutional symptoms. \par \par PMH notable for 2 pregnancies, IBS, Ulcerative Colitis, hypothyroidism, right rotator cuff tear, ruptured appendix/appendectomy 1991, septic gallbladder 2014, Torn meniscus 1993. Former smoker (quit 1985), Social alcohol use\par \par Family history of malignancies involves maternal aunt with breast cancer @ age 60 and paternal aunt @ age 50. Maternal grandmother with ovarian cancer. Patient states she goes for routine mammograms/sonograms. Never had genetic testing. Denies family history of skin cancer or melanoma.\par \par INTERVAL HISTORY:\par 4/4/18 - She is now s/p wide excision of a right  right lateral forearm melanoma. \par Final pathology revealed residual melanoma in situ and scar secondary to prior procedure, completely excised. \par Maximum tumor thickness measuring 0.4 mm, no ulceration, regression or lymphovascular invasion. Tumor stage:  pT1a\par  \par 4/17/18 - Today she is doing well.  She had a postoperative cellulitis and was treated with clindamycin.  No fevers or chills.  No pain issues. \par \par 8/30/18-  She denies new skin lesions or masses. She states she developed right forearm swelling and erythema approximately 6 weeks ago and was treated for cellulitis which has now resolved. She reports she was seen by Dr. Campbell 2 weeks ago and had a left hand biopsy.  \par \par 12/13/18 - Denies new skin lesions. She continues to see her dermatologist, Dr. Campbell every 3 months with no new biopsies. Feeling well with no complaints. \par \par 4/25/19-  Continues f/u with dermatology. States she was hospitalized in 1/2019 for difficulty walking and swallowing, newly diagnosed with Parkinsons' Disease.   States she was started on a Mediterranean diet, PT and exercise 2-3 hours per day. Denies new skin lesions or masses. No bleeding or pruritic moles. \par \par Dermatologist: Dr. Rambo Campbell ([place])\par Internist: Dr. Lupillo Allred ([place])

## 2019-04-25 NOTE — CONSULT LETTER
[Dear  ___] : Dear  [unfilled], [Consult Letter:] : I had the pleasure of evaluating your patient, [unfilled]. [Please see my note below.] : Please see my note below. [Sincerely,] : Sincerely, [Consult Closing:] : Thank you very much for allowing me to participate in the care of this patient.  If you have any questions, please do not hesitate to contact me. [DrJennifer  ___] : Dr. LANE [FreeTextEntry3] : Fly Caraballo MD, MPH, FACS\par Surgical Oncology\par Assistant Professor of Surgery\par [facility] [employer] [facility]

## 2019-04-25 NOTE — PHYSICAL EXAM
[Normal] : supple, no neck mass and thyroid not enlarged [Normal Neck Lymph Nodes] : normal neck lymph nodes  [Normal Supraclavicular Lymph Nodes] : normal supraclavicular lymph nodes [Normal Axillary Lymph Nodes] : normal axillary lymph nodes [Normal] : oriented to person, place and time, with appropriate affect [de-identified] : healed right forearm incision with no evidence of recurrence

## 2019-07-03 ENCOUNTER — APPOINTMENT (OUTPATIENT)
Dept: NEUROLOGY | Facility: CLINIC | Age: 69
End: 2019-07-03
Payer: MEDICARE

## 2019-07-03 VITALS
OXYGEN SATURATION: 100 % | DIASTOLIC BLOOD PRESSURE: 75 MMHG | TEMPERATURE: 97.4 F | HEART RATE: 61 BPM | SYSTOLIC BLOOD PRESSURE: 113 MMHG

## 2019-07-03 VITALS
WEIGHT: 142 LBS | SYSTOLIC BLOOD PRESSURE: 105 MMHG | HEIGHT: 64 IN | BODY MASS INDEX: 24.24 KG/M2 | OXYGEN SATURATION: 97 % | HEART RATE: 60 BPM | DIASTOLIC BLOOD PRESSURE: 66 MMHG

## 2019-07-03 PROCEDURE — 99215 OFFICE O/P EST HI 40 MIN: CPT

## 2019-07-03 RX ORDER — LEVOTHYROXINE SODIUM 0.12 MG/1
125 TABLET ORAL DAILY
Refills: 0 | Status: DISCONTINUED | COMMUNITY
Start: 2017-10-25 | End: 2019-07-03

## 2019-07-03 NOTE — ASSESSMENT
[FreeTextEntry1] : 69 yo RH woman, with family h/o of PD (mother had PD with autonomic dysfunction) with PD symptoms since 2016, with loss of dexterity, changes in handwriting, and more recently tremor in the right hand.\par She was diagnosed with PD after a MRI and Muna Scan done a few months ago in early 2019, and she has been on Sinemet 25/100 3 x day since early 2019, with excellent response.\par \par On exam her neurological function appears overall improved, most likely because of her intense exercise regimen.\par \par Continue Sinemet, Stalevo at 75 or 100 may be an alternative, but she is doing well on the drug, and may consider change at subsequent visits.\par Will hld on Azilect as she has some hypotension.\par Patient with mild hypotension and symptoms of OH, may benefit from hydration, avoiding sudden changes in position, sleep with elevated head / feet\par Diazepam even if PRN not indicated in PD. [place] acting benzodiazepines may be better indicated,\par \par Encouraged to increase exercise and physical activity and maintain an active social and intellectual life.\par More than 50% of the visit were dedicated to review of treatment, therapeutic plan, and prognosis, and patient was counseled on coping and physical and emotional wellbeing.\par .\par

## 2019-07-03 NOTE — HISTORY OF PRESENT ILLNESS
[FreeTextEntry1] : 67 yo RH female patient was diagnosed with Parkinson's disease in 2019 with family h/o of PD (mother had PD with autonomic dysfunction) with PD symptoms since 2016, with loss of dexterity, changes in handwriting, and more recently tremor in the right hand.\par She was diagnosed with PD after a MRI and Muna Scan done a few months ago in early 2019, and she has been on Sinemet 25/100 3 x day for less than 3 months.\par \par Pt states she feels an overall improvement since last visit. She feels increased strength since increasing her work out routine. She has states her balance and gait has improved since therapy. Pt denies any falls. Pt states her tremor of R hand occasionally occurs but has decreased in frequency. She states she goes to acupuncture to help with tremor and stiff shoulder and found improvement. Pt states she is still able to perform all ADLs independently and has seen some improvement in handwriting. Pt denies any dyskinesias. She denies any wearing OFF and states she forgets her medication about 1 a week or every 2 weeks and doesn't notice any difference. \par \par Pt states she often feels dizziness and lightheadedness daily at the gym. She states she often feels the dizziness both when sitting down but even worsened when getting up. Pt denies any near syncopal episodes related to OH. Today BP is 113/75 sitting and 105/66 standing.\par \par She works out 1.5-3 hrs daily at the gym and participates in [employer] boxing 1 x week.\par Pt states she participates in PT 1 x a week. \par \par Pt states sleep is normal with about 6-8 hrs a night. Pt states she has no problems falling asleep but wakes up earlier. Pt states she experienced a nightmare last night but has not for a while before that. Pt denies screaming, kicking, hitting acting out dreams or other related RBD movements. \par Pt states she experiences ulcerative colitis attacks (last time was 1 week ago and before that 6 months ago). Her stool is often loose. She takes Cholestyramine and Imodium PRN.\par Pt states she experiences urinary urgency and frequency especially when experiencing colitis. \par Pt states she has had recent changes in vision. She plans has an appointment with Dr. Wang, neuro ophthalmologist next week. \par Pt states mood is great.\par Pt denies daytime somnolence.

## 2019-07-03 NOTE — PHYSICAL EXAM
[FreeTextEntry1] : Patient with normal cognitive function, mild hypomimia with diminished blinking, mild reduction of voice volume but no significant dysarthria.\par There is mild rigidity with some cogwheeling, present bilaterally, more evident on the left side.\par No significant tremor is present on examination\par There is mild loss of motor dexterity, with minimal decrement at rapid sequential and rapid alternating movements, also slightly more evident on the left side.\par Foot tapping is minimally impaired bilaterally, with some decreased amplitude.\par Posture is normal, with no significant shortening of gait stride and slight asymmetry with left reduction, and decrease arm swing  limited the left side.\par Postural reflexes are normal.\par

## 2019-07-10 ENCOUNTER — NON-APPOINTMENT (OUTPATIENT)
Age: 69
End: 2019-07-10

## 2019-07-10 ENCOUNTER — APPOINTMENT (OUTPATIENT)
Dept: OPHTHALMOLOGY | Facility: CLINIC | Age: 69
End: 2019-07-10
Payer: MEDICARE

## 2019-07-10 DIAGNOSIS — G20 PARKINSON'S DISEASE: ICD-10-CM

## 2019-07-10 PROCEDURE — 92133 CPTRZD OPH DX IMG PST SGM ON: CPT

## 2019-07-10 PROCEDURE — 92004 COMPRE OPH EXAM NEW PT 1/>: CPT

## 2019-07-10 PROCEDURE — 92083 EXTENDED VISUAL FIELD XM: CPT

## 2019-08-05 PROBLEM — G20 PARKINSON'S DISEASE: Status: ACTIVE | Noted: 2019-04-25

## 2019-08-08 ENCOUNTER — TRANSCRIPTION ENCOUNTER (OUTPATIENT)
Age: 69
End: 2019-08-08

## 2019-08-15 ENCOUNTER — APPOINTMENT (OUTPATIENT)
Dept: ORTHOPEDIC SURGERY | Facility: CLINIC | Age: 69
End: 2019-08-15
Payer: MEDICARE

## 2019-08-15 DIAGNOSIS — M17.12 UNILATERAL PRIMARY OSTEOARTHRITIS, LEFT KNEE: ICD-10-CM

## 2019-08-15 DIAGNOSIS — M23.92 UNSPECIFIED INTERNAL DERANGEMENT OF LEFT KNEE: ICD-10-CM

## 2019-08-15 PROCEDURE — 20610 DRAIN/INJ JOINT/BURSA W/O US: CPT | Mod: LT

## 2019-08-15 PROCEDURE — 99213 OFFICE O/P EST LOW 20 MIN: CPT | Mod: 25

## 2019-08-15 PROCEDURE — 73562 X-RAY EXAM OF KNEE 3: CPT | Mod: LT

## 2019-08-19 ENCOUNTER — TRANSCRIPTION ENCOUNTER (OUTPATIENT)
Age: 69
End: 2019-08-19

## 2019-08-23 PROBLEM — M23.92 INTERNAL DERANGEMENT OF LEFT KNEE: Status: ACTIVE | Noted: 2019-08-15

## 2019-08-29 ENCOUNTER — RESULT REVIEW (OUTPATIENT)
Age: 69
End: 2019-08-29

## 2019-10-02 ENCOUNTER — RESULT REVIEW (OUTPATIENT)
Age: 69
End: 2019-10-02

## 2019-10-03 ENCOUNTER — APPOINTMENT (OUTPATIENT)
Dept: DERMATOLOGY | Facility: CLINIC | Age: 69
End: 2019-10-03
Payer: MEDICARE

## 2019-10-03 PROCEDURE — 17000 DESTRUCT PREMALG LESION: CPT | Mod: 59

## 2019-10-03 PROCEDURE — 99214 OFFICE O/P EST MOD 30 MIN: CPT | Mod: 25

## 2019-10-03 PROCEDURE — 11102 TANGNTL BX SKIN SINGLE LES: CPT

## 2019-10-18 ENCOUNTER — APPOINTMENT (OUTPATIENT)
Dept: DERMATOLOGY | Facility: CLINIC | Age: 69
End: 2019-10-18
Payer: MEDICARE

## 2019-10-18 PROCEDURE — 99213 OFFICE O/P EST LOW 20 MIN: CPT

## 2019-11-07 ENCOUNTER — APPOINTMENT (OUTPATIENT)
Dept: NEUROLOGY | Facility: CLINIC | Age: 69
End: 2019-11-07

## 2019-11-13 ENCOUNTER — RESULT REVIEW (OUTPATIENT)
Age: 69
End: 2019-11-13

## 2019-11-13 ENCOUNTER — APPOINTMENT (OUTPATIENT)
Dept: DERMATOLOGY | Facility: CLINIC | Age: 69
End: 2019-11-13
Payer: MEDICARE

## 2019-11-13 PROCEDURE — 11302 SHAVE SKIN LESION 1.1-2.0 CM: CPT

## 2019-11-14 ENCOUNTER — APPOINTMENT (OUTPATIENT)
Dept: SURGICAL ONCOLOGY | Facility: CLINIC | Age: 69
End: 2019-11-14
Payer: MEDICARE

## 2019-11-14 VITALS
DIASTOLIC BLOOD PRESSURE: 75 MMHG | HEIGHT: 64 IN | SYSTOLIC BLOOD PRESSURE: 112 MMHG | WEIGHT: 137 LBS | HEART RATE: 73 BPM | BODY MASS INDEX: 23.39 KG/M2

## 2019-11-14 DIAGNOSIS — C43.61 MALIGNANT MELANOMA OF RIGHT UPPER LIMB, INCLUDING SHOULDER: ICD-10-CM

## 2019-11-14 PROCEDURE — 99214 OFFICE O/P EST MOD 30 MIN: CPT

## 2019-11-14 RX ORDER — RIFAXIMIN 550 MG/1
550 TABLET ORAL
Qty: 42 | Refills: 0 | Status: ACTIVE | COMMUNITY
Start: 2019-10-17

## 2019-11-14 RX ORDER — CHOLESTYRAMINE 4 G/9G
4 POWDER, FOR SUSPENSION ORAL
Qty: 30 | Refills: 0 | Status: ACTIVE | COMMUNITY
Start: 2019-08-29

## 2019-11-14 NOTE — ASSESSMENT
[FreeTextEntry1] : 68 year old female with Parkinsons disease presents for a melanoma follow up visit. She underwent a shave biopsy of a suspicious right forearm lesion which revealed a T1 (0.4 mm) melanoma. She is now s/p wide excision of a right  right lateral forearm melanoma. Final pathology revealed residual melanoma in situ and scar secondary to prior procedure, completely excised. Maximum tumor thickness measuring 0.4 mm, no ulceration, regression or lymphovascular invasion. Tumor stage:  pT1a. \par \par 11/14/19-  States she was seen by Dr. Campbell in October 2019 and underwent a shave biopsy of her right inferior lateral back which revealed a dysplastic melanocytic nevus with mild atypia that Dr. Campbell froze and the patient is to f/u next month.  Denies new skin lesions or masses. No bleeding or pruritic moles.  She is feeling well overall.  On Sinemet for Parkinsons.  She continues to eat healthy and exercise.  \par \par PLAN:\par 1) RTO Q6 months\par 2) Continue dermatologic surveillance with Dr. Campbell

## 2019-11-14 NOTE — PHYSICAL EXAM
[Normal] : supple, no neck mass and thyroid not enlarged [Normal Neck Lymph Nodes] : normal neck lymph nodes  [Normal Supraclavicular Lymph Nodes] : normal supraclavicular lymph nodes [Normal Axillary Lymph Nodes] : normal axillary lymph nodes [Normal] : oriented to person, place and time, with appropriate affect [de-identified] : healed right forearm incision with no evidence of recurrence

## 2019-11-14 NOTE — HISTORY OF PRESENT ILLNESS
[de-identified] : Mrs. Vaughn is a 69 year old female presents for a 6 month melanoma follow up visit. \par She was initially referred by Dr. Rambo Campbell ([place]) on 3/8/18.\par She reports she noticed a changing right lateral forearm mole x 9 months and sought care with a dermatologist. \par \par She is s/p right distal dorsal forearm shave biopsy performed on 2/6/18. \par \par Final pathology revealed MALIGNANT MELANOMA, invasive to 0.4 mm, pT1a, closely approaching peripheral margin. No definite mitotic figures, ulceration or regression changes. \par \par Denies any other concerning lesions or masses. Denies bleeding or pruritic moles.  Denies pain or constitutional symptoms. \par \par PMH notable for 2 pregnancies, IBS, Ulcerative Colitis, hypothyroidism, right rotator cuff tear, ruptured appendix/appendectomy 1991, septic gallbladder 2014, Torn meniscus 1993. Former smoker (quit 1985), Social alcohol use\par \par Family history of malignancies involves maternal aunt with breast cancer @ age 60 and paternal aunt @ age 50. Maternal grandmother with ovarian cancer. Patient states she goes for routine mammograms/sonograms. Never had genetic testing. Denies family history of skin cancer or melanoma.\par \par INTERVAL HISTORY:\par 4/4/18 - She is now s/p wide excision of a right  right lateral forearm melanoma. \par Final pathology revealed residual melanoma in situ and scar secondary to prior procedure, completely excised. \par Maximum tumor thickness measuring 0.4 mm, no ulceration, regression or lymphovascular invasion. Tumor stage:  pT1a\par  \par 4/17/18 - Today she is doing well.  She had a postoperative cellulitis and was treated with clindamycin.  No fevers or chills.  No pain issues. \par \par 8/30/18-  She denies new skin lesions or masses. She states she developed right forearm swelling and erythema approximately 6 weeks ago and was treated for cellulitis which has now resolved. She reports she was seen by Dr. Campbell 2 weeks ago and had a left hand biopsy.  \par \par 12/13/18 - Denies new skin lesions. She continues to see her dermatologist, Dr. Campbell every 3 months with no new biopsies. Feeling well with no complaints. \par \par 4/25/19-  Continues f/u with dermatology. States she was hospitalized in 1/2019 for difficulty walking and swallowing, newly diagnosed with Parkinsons' Disease.   States she was started on a Mediterranean diet, PT and exercise 2-3 hours per day. Denies new skin lesions or masses. No bleeding or pruritic moles. \par \par 11/14/19-  States she was seen by Dr. Campbell in October 2019 and underwent a shave biopsy of her right inferior lateral back which revealed a dysplastic melanocytic nevus with mild atypia that Dr. Campbell froze and the patient is to f/u next month.  Denies new skin lesions or masses. No bleeding or pruritic moles.  She is feeling well overall.  On Sinemet for Parkinsons.  She continues to eat healthy and exercise.  Torn meniscus. \par \par Dermatologist: Dr. Rambo Campbell ([place])\par Internist: Dr. Lupillo Allred ([place])

## 2019-11-14 NOTE — CONSULT LETTER
[Dear  ___] : Dear  [unfilled], [Consult Letter:] : I had the pleasure of evaluating your patient, [unfilled]. [Consult Closing:] : Thank you very much for allowing me to participate in the care of this patient.  If you have any questions, please do not hesitate to contact me. [Please see my note below.] : Please see my note below. [Sincerely,] : Sincerely, [FreeTextEntry3] : Fly Caraballo MD, MPH, FACS\par Surgical Oncology\par Assistant Professor of Surgery\par [facility] [employer] [facility]  [DrJennifer  ___] : Dr. LANE

## 2019-11-20 ENCOUNTER — APPOINTMENT (OUTPATIENT)
Dept: DERMATOLOGY | Facility: CLINIC | Age: 69
End: 2019-11-20

## 2019-12-19 ENCOUNTER — RESULT REVIEW (OUTPATIENT)
Age: 69
End: 2019-12-19

## 2020-04-06 ENCOUNTER — APPOINTMENT (OUTPATIENT)
Dept: DERMATOLOGY | Facility: CLINIC | Age: 70
End: 2020-04-06

## 2020-05-14 ENCOUNTER — APPOINTMENT (OUTPATIENT)
Dept: SURGICAL ONCOLOGY | Facility: CLINIC | Age: 70
End: 2020-05-14

## 2020-07-08 NOTE — ED ADULT NURSE REASSESSMENT NOTE - CAS EDN INTEG ASSESS
Subjective:       Patient ID: Edd Wills is a 65 y.o. male.    Chief Complaint: Annual Exam and Establish Care    66 yo male presents for annual physical exam.    He is concerned about a lump in the back of his right thigh which has been present for months.  He is morbidly obese and struggles to lose weight.  He has chronic knee pain and states that he will need painkillers.  He gets constipation.  He had a positive FIT test 2 years ago and states that he has been putting off colonoscopy.  He does not routinely check his blood pressure at home.  He states that he walks for exercise and needs the assistance of a wheeled walker.  No recent falls.    The following portions of the patient's history were reviewed and updated as appropriate: allergies, current medications, past family history, past medical history, past social history, past surgical history and problem list.    Review of Systems   Constitutional: Negative for activity change, appetite change, chills, diaphoresis, fatigue, fever and unexpected weight change.   HENT: Negative for nasal congestion, dental problem, facial swelling, nosebleeds, postnasal drip, rhinorrhea, sore throat, tinnitus and trouble swallowing.    Eyes: Negative for pain, discharge, itching and visual disturbance.   Respiratory: Negative for apnea, chest tightness, shortness of breath, wheezing and stridor.    Cardiovascular: Negative for chest pain, palpitations and leg swelling.   Gastrointestinal: Positive for constipation. Negative for abdominal distention, abdominal pain, anal bleeding, blood in stool, diarrhea, nausea, rectal pain and vomiting.   Endocrine: Negative for cold intolerance, heat intolerance and polyuria.   Genitourinary: Negative for difficulty urinating, dysuria, frequency, hematuria and urgency.   Musculoskeletal: Positive for arthralgias, gait problem and myalgias. Negative for neck pain and neck stiffness.   Integumentary:  Positive for mole/lesion. Negative for  "color change and rash.   Neurological: Negative for dizziness, tremors, seizures, syncope, facial asymmetry, weakness and headaches.   Hematological: Negative for adenopathy. Does not bruise/bleed easily.   Psychiatric/Behavioral: Positive for dysphoric mood. Negative for agitation, confusion, hallucinations, self-injury and suicidal ideas. The patient is not hyperactive.          Objective:       Vitals:    07/08/20 0956 07/08/20 1214   BP: (!) 168/86 (!) 158/84   BP Location:  Right arm   Patient Position:  Sitting   BP Method:  Large (Manual)   Pulse: 84    Temp: 97.8 °F (36.6 °C)    TempSrc: Temporal    SpO2: 98%    Weight: (!) 186.1 kg (410 lb 4.4 oz)    Height: 5' 10" (1.778 m)      Physical Exam  Constitutional:       General: He is not in acute distress.     Appearance: He is well-developed. He is obese. He is not diaphoretic.   HENT:      Head: Normocephalic and atraumatic.      Right Ear: External ear normal.      Left Ear: External ear normal.   Eyes:      General: No scleral icterus.        Right eye: No discharge.         Left eye: No discharge.      Conjunctiva/sclera: Conjunctivae normal.      Pupils: Pupils are equal, round, and reactive to light.   Neck:      Musculoskeletal: Normal range of motion and neck supple.      Thyroid: No thyromegaly.   Cardiovascular:      Rate and Rhythm: Normal rate and regular rhythm.      Heart sounds: Normal heart sounds. No murmur. No friction rub. No gallop.    Pulmonary:      Effort: Pulmonary effort is normal. No respiratory distress.      Breath sounds: Normal breath sounds.   Chest:      Chest wall: No tenderness.   Abdominal:      General: Bowel sounds are normal. There is distension (obese).      Palpations: Abdomen is soft.      Tenderness: There is no abdominal tenderness. There is no guarding or rebound.   Musculoskeletal:      Comments: Antalgic gait.  Steady with walker   Lymphadenopathy:      Cervical: No cervical adenopathy.   Skin:     General: Skin " is warm.      Capillary Refill: Capillary refill takes less than 2 seconds.      Findings: Lesion (induration of skin at the right posterior thigh just above the posterior knee, 10 cm swelling, with area of fluctulance) present. No rash.   Neurological:      Mental Status: He is alert and oriented to person, place, and time.      Cranial Nerves: No cranial nerve deficit.      Motor: No abnormal muscle tone.      Coordination: Coordination normal.      Deep Tendon Reflexes: Reflexes normal.   Psychiatric:         Thought Content: Thought content normal.         Judgment: Judgment normal.         Assessment:       1. Annual physical exam    2. Establishing care with new doctor, encounter for    3. Essential hypertension    4. History of aortic dissection    5. LLUVIA (obstructive sleep apnea)    6. Class 3 severe obesity due to excess calories with serious comorbidity and body mass index (BMI) of 50.0 to 59.9 in adult    7. Primary osteoarthritis of both knees    8. Gait instability    9. History of cervical spinal surgery    10. Abscess    11. Chronic idiopathic constipation    12. Immunity status testing    13. HIV screening declined    14. Screening for malignant neoplasm of prostate    15. Encounter for lipid screening for cardiovascular disease    16. Screening for malignant neoplasm of colon    17. Preop testing        Plan:       1. Annual physical exam  2. Establishing care with new doctor, encounter for  Age appropriate prevention guidelines implemented, unless patient refused  Encourage Advanced directives  Labs ordered   Immunizations reviewed. Encourage yearly influenza vaccine.  Patient Counseling:  --Nutrition: Encourage healthy food choices, adequate water intake, moderate sodium/caffeine intake, diet low in saturated fat and cholesterol. Importance of caloric balance and sufficient intake of fresh fruits, vegetables, fiber, calcium, iron, and 1 mg of folate supplement per day (for females capable of  pregnancy).  --Exercise: Stressed the importance of regular exercise.   --Substance Abuse: Abstinence from tobacco products. No more than 2 alcoholic drinks per day in men or 1 alcoholic drink per day in women. Avoid illicit drugs, driving or other dangerous activities under the influence. In the event of abuse, treatment offered.   --Sexuality: Safe sex practices to avoid sexually transmitted diseases; careful partner selection, use of condoms and contraceptive alternatives, avoidance of unintended pregnancy in fertile women of child-bearing age  --Injury prevention: encourage safety belts, safety helmets, smoke detector.  --Dental health: Discussed importance of regular tooth brushing X2 daily, flossing X1 daily, and routine dental visits.  --Encourage use of sun screen, wear sun protective clothing  --Encourage routine eye exams    3. Essential hypertension  Nurse only blood pressure repeat in 2 weeks  Restart lisinopril. Continue carvedilol and amlodipine  -     lisinopriL (PRINIVIL,ZESTRIL) 40 MG tablet; Take 1 tablet (40 mg total) by mouth once daily.  Dispense: 90 tablet; Refill: 3  -     CBC Without Differential; Future  -     Comprehensive metabolic panel; Future  -     TSH; Future  -     Microalbumin/creatinine urine ratio  -     carvediloL (COREG) 25 MG tablet; Take 1 tablet (25 mg total) by mouth once daily.  Dispense: 90 tablet; Refill: 3    4. History of aortic dissection  Hx of B type dissection. Lost to follow up with Vascular Medicine.    5. LLUVIA (obstructive sleep apnea)  Discussed the role of uncontrolled sleep apnea in cardiovascular mortality    6. Class 3 severe obesity due to excess calories with serious comorbidity and body mass index (BMI) of 50.0 to 59.9 in adult  -     Ambulatory referral/consult to Bariatric Medicine; Future; Expected date: 07/15/2020    The importance of optimum diet & exercise discussed. The goals for weight management of 5-10 % of total body weight reduction in 3 months  addressed.     The consumption of a reduced calorie diet, frequent self-weighing, and participation in a lifestyle intervention program are strategies to help maintain weight loss. Bariatric surgery, which may alter the body's adipose tissue set point, and extended use of anti-obesity pharmacologic therapy may help address these underlying physiologic changes.     7. Primary osteoarthritis of both knees  I will not prescribe long-term narcotics in a patient who does not have a definitive plan for surgical intervention.  He will continue on conservative management including joint injections.  Limit use of nonsteroidal anti-inflammatories.  May take Tylenol as needed.  Topical therapies are also helpful.  Weight loss would be extremely beneficial.    8. Gait instability  Ambulates with assistance of walker    9. History of cervical spinal surgery  cervical myelopathy s/p c-spine laminetomy/fusion 6/2016  Manages conservatively.  Gabapentin may be beneficial but patient has not been taking this medication and does not recall any difference in his overall level of functioning.    10. Abscess  -     Ambulatory referral/consult to General Surgery; Future; Expected date: 07/15/2020    11. Chronic idiopathic constipation  -     psyllium (METAMUCIL) packet; Take 1 packet by mouth daily as needed.  Dispense: 90 packet; Refill: 3  -     diclofenac sodium (VOLTAREN) 1 % Gel; Apply topically 3 (three) times daily as needed.  Dispense: 500 g; Refill: 6  -     docusate sodium (COLACE) 100 MG capsule; Take 1 capsule (100 mg total) by mouth 2 (two) times daily as needed for Constipation.  Dispense: 60 capsule; Refill: 0  Discussed importance of colonoscopy especially since he had a positive fit testing 2 years ago  Adequate hydration.  Mobilization.  Ensure adequate consumption of fiber.    12. Immunity status testing  -     COVID-19 (SARS CoV-2) IgG Antibody; Future    13. HIV screening declined  No high risk behavior    14.  Screening for malignant neoplasm of prostate  -     PSA, Screening; Future    15. Encounter for lipid screening for cardiovascular disease  -     Lipid Panel; Future    16. Screening for malignant neoplasm of colon  -     Case request GI: COLONOSCOPY    17. Preop testing  -     IN OFFICE EKG 12-LEAD (to Muse)  I have independently reviewed and interpreted the EKG which shows a heart rate of 65 beats per minute, normal sinus rhythm, left ventricular hypertrophy by voltage criteria, no acute ischemic changes    Disclaimer: This note has been generated using voice-recognition software. There may be typographical errors that have been missed during proof-reading        WDL

## 2020-08-25 ENCOUNTER — TRANSCRIPTION ENCOUNTER (OUTPATIENT)
Age: 70
End: 2020-08-25

## 2020-09-09 NOTE — DISCHARGE NOTE ADULT - PROVIDER TOKENS
Refill request for olmesartan medication.      Name of [facility]- [facility]    Last visit - 6/11/20     Pending visit - 10/29/20    Last refill -2/27/20 TOKEN:'[id number]',TOKEN:'[id number]'

## 2020-09-21 ENCOUNTER — APPOINTMENT (OUTPATIENT)
Dept: DERMATOLOGY | Facility: CLINIC | Age: 70
End: 2020-09-21
Payer: MEDICARE

## 2020-09-21 PROCEDURE — 17003 DESTRUCT PREMALG LES 2-14: CPT

## 2020-09-21 PROCEDURE — 17000 DESTRUCT PREMALG LESION: CPT

## 2020-09-21 PROCEDURE — 99214 OFFICE O/P EST MOD 30 MIN: CPT | Mod: 25

## 2020-11-06 ENCOUNTER — RESULT REVIEW (OUTPATIENT)
Age: 70
End: 2020-11-06

## 2020-11-06 ENCOUNTER — APPOINTMENT (OUTPATIENT)
Dept: DERMATOLOGY | Facility: CLINIC | Age: 70
End: 2020-11-06
Payer: MEDICARE

## 2020-11-06 PROCEDURE — 17000 DESTRUCT PREMALG LESION: CPT | Mod: 59

## 2020-11-06 PROCEDURE — 11102 TANGNTL BX SKIN SINGLE LES: CPT

## 2020-11-06 PROCEDURE — 17003 DESTRUCT PREMALG LES 2-14: CPT

## 2020-11-06 PROCEDURE — 99214 OFFICE O/P EST MOD 30 MIN: CPT | Mod: 25

## 2020-11-07 ENCOUNTER — TRANSCRIPTION ENCOUNTER (OUTPATIENT)
Age: 70
End: 2020-11-07

## 2020-11-07 ENCOUNTER — APPOINTMENT (OUTPATIENT)
Dept: DERMATOLOGY | Facility: CLINIC | Age: 70
End: 2020-11-07
Payer: MEDICARE

## 2020-11-07 PROCEDURE — 99214 OFFICE O/P EST MOD 30 MIN: CPT | Mod: 24

## 2020-12-08 ENCOUNTER — APPOINTMENT (OUTPATIENT)
Dept: DERMATOLOGY | Facility: CLINIC | Age: 70
End: 2020-12-08
Payer: MEDICARE

## 2020-12-08 ENCOUNTER — RESULT REVIEW (OUTPATIENT)
Age: 70
End: 2020-12-08

## 2020-12-08 PROCEDURE — 17000 DESTRUCT PREMALG LESION: CPT | Mod: 59

## 2020-12-08 PROCEDURE — 11102 TANGNTL BX SKIN SINGLE LES: CPT | Mod: 59

## 2020-12-08 PROCEDURE — 99214 OFFICE O/P EST MOD 30 MIN: CPT | Mod: 25

## 2020-12-08 PROCEDURE — 17272 DSTR MAL LES S/N/H/F/G 1.1-2: CPT

## 2020-12-08 PROCEDURE — 17110 DESTRUCTION B9 LES UP TO 14: CPT | Mod: 59

## 2020-12-21 ENCOUNTER — APPOINTMENT (OUTPATIENT)
Dept: DERMATOLOGY | Facility: CLINIC | Age: 70
End: 2020-12-21

## 2021-03-04 ENCOUNTER — TRANSCRIPTION ENCOUNTER (OUTPATIENT)
Age: 71
End: 2021-03-04

## 2021-03-25 NOTE — SWALLOW BEDSIDE ASSESSMENT ADULT - ADDITIONAL RECOMMENDATIONS
[facility] Surgery Consultation    Nico Coello MRN# [id number]   Age: 54 year old YOB: 1966     Date of Admission:  3/24/2021    Date of Consult:   3/24/21    Reason for consult: Acute cholecystitis        Requesting service: ED; requesting provider: Dr. Phillips                    Assessment and Plan:   Assessment:   Mr. Coello is a 54 yr old male with PMHx of diabetes (not currently on medications), HTN and HLD who presents to the ED with chills, shaking and right sided pain. Physical exam, labs and imaging consistent with acute cholecystitis. Afebrile, hemodynamically stable.       Plan:   -Admit to general surgery  -NPO, IVF  -Continue zosyn   -PTA antihypertensives  -Add on for OR tomorrow     Discussed with chief resident, Dr. Rankin, who will discuss with staff.     Lauren Hernandez MD  General Surgery, PGY2  [phone]            Chief Complaint:   Chills, shaking, pain          History of Present Illness:   Mr. Coello is a 54 yr old male with PMHx of diabetes (not currently on medications), HTN and HLD who presents to the ED with chills, shaking and right sided abdominal/flank pain.     Began having right abdomen/flank pain, chills and shaking early AM today, this resolved and then, in the evening, chills and shivering returned but no pain so he came to ED. Denies nausea, vomiting, fevers, chest pain or SOB. Denies significant abdominal or flank pain currently. Has been voiding easily at home, eating and drinking normally. Notes constipation at baseline and will take senna. Has had dark stool since starting iron for anemia a few months ago. No blood stools. Has not had a colonoscopy in the past. No history of similar symptoms, no history of problems with the gallbladder.     In the ED, he is afebrile and hemodynamically stable. Labs significant for Cr 2.3 (normal about 2 months ago), leukocytosis of 26.8, hgb 10.8 (was similar at visit with PCP about 2 months ago). Imaging demonstrates  cholelithiasis, gallbladder wall thickening and inflammation.     He denies any past surgical history. No history of heart, lung, liver or kidney disease. No history of bleeding or clotting disorders.          Past Medical History:   HTN, HLD, diabetes          Past Surgical History:   Denies past surgical history.        Social History:     Social History     Tobacco Use     Smoking status: Never Smoker     Smokeless tobacco: Never Used   Substance Use Topics     Alcohol use: Never     Frequency: Never             Family History:   Non contributory            Allergies:   No Known Allergies          Medications:     Current Facility-Administered Medications   Medication     0.9% sodium chloride BOLUS     piperacillin-tazobactam (ZOSYN) 3.375 g vial to attach to  mL bag     sodium chloride (PF) 0.9% PF flush 3 mL     sodium chloride (PF) 0.9% PF flush 3 mL     Current Outpatient Medications   Medication Sig     amLODIPine-benazepril (LOTREL) 10-40 MG capsule Take 1 capsule by mouth daily     atorvastatin (LIPITOR) 40 MG tablet Take 40 mg by mouth daily               Review of Systems:   Negative except as noted in HPI         Physical Exam:   All vitals have been reviewed  Temp:  [100.7  F (38.2  C)] 100.7  F (38.2  C)  Pulse:  [100-123] 100  Resp:  [16-20] 20  BP: (127-156)/(70-71) 127/71  SpO2:  [98 %-100 %] 98 %      Physical Exam:  Gen: awake, NAD  HEENT: NCAT, EOMI  CV: RRR to palpation   Resp: unlabored breathing on RA  Abd: obese, soft, non distended. Focally tender to deep palpation in the RUQ. No rebound or guarding.   Ext: warm, well perfused   Neuro: alert, moving all extremities spontaneously. No focal deficits.          Data:   All laboratory data reviewed    Results:  BMP  Recent Labs   Lab 03/24/21  2133      POTASSIUM 3.7   CHLORIDE 103   CO2 25   BUN 30   CR 2.31*   *     CBC  Recent Labs   Lab 03/24/21  2133   WBC 26.8*   HGB 10.8*        LFT  Recent Labs   Lab  03/24/21  2133   AST 36   ALT 27   ALKPHOS 119   BILITOTAL 1.3   ALBUMIN 3.4     Recent Labs   Lab 03/24/21  2133   *       Imaging:  US 3/25/2021  Impression:   Cholelithiasis and gallbladder sludge with wall thickening and  pericholecystic edema. Findings suggestive of acute cholecystitis,  however sonographic Villasenor sign was negative. A nuclear medicine scan  could be considered to confirm acute cholecystitis.    CT 3/24/2021  IMPRESSION:   Prominent wall thickening and soft tissue stranding surrounding gallbladder worrisome for acute cholecystitis.                Will continue to follow

## 2021-04-04 ENCOUNTER — TRANSCRIPTION ENCOUNTER (OUTPATIENT)
Age: 71
End: 2021-04-04

## 2021-04-08 NOTE — ED STATDOCS - NSCAREINITIATED _GEN_ER
----- Message from Heidi Palm sent at 2/21/2019  7:29 AM CST -----  Contact: Daughter Benradette [phone]  Please call her, she would like to see the doctor today instead of having to come back in a week.  Thank you!  
Per provider call  without UDS results.
Spoke with Pt's daughter regarding her moms appt. Daughter was asking if mom could be seen today after having bloodwork or have Dr Steven review the lab results instead of coming next week. Daughter was notified that not all of her labs would be ready today and that is why her moms appt is next week. Dr Steven will go over all her lab results then. Daughter verbalized understanding.   
Adiel Cárdenas(Attending)

## 2021-06-08 ENCOUNTER — APPOINTMENT (OUTPATIENT)
Dept: DERMATOLOGY | Facility: CLINIC | Age: 71
End: 2021-06-08
Payer: MEDICARE

## 2021-06-08 PROCEDURE — 99214 OFFICE O/P EST MOD 30 MIN: CPT | Mod: 25

## 2021-06-08 PROCEDURE — 17000 DESTRUCT PREMALG LESION: CPT | Mod: 59

## 2021-06-08 PROCEDURE — 17003 DESTRUCT PREMALG LES 2-14: CPT | Mod: 59

## 2021-06-08 PROCEDURE — 17110 DESTRUCTION B9 LES UP TO 14: CPT

## 2021-06-17 ENCOUNTER — APPOINTMENT (OUTPATIENT)
Dept: NEUROLOGY | Facility: CLINIC | Age: 71
End: 2021-06-17
Payer: MEDICARE

## 2021-06-17 VITALS
BODY MASS INDEX: 24.24 KG/M2 | HEART RATE: 83 BPM | SYSTOLIC BLOOD PRESSURE: 105 MMHG | WEIGHT: 142 LBS | HEIGHT: 64 IN | DIASTOLIC BLOOD PRESSURE: 71 MMHG

## 2021-06-17 VITALS — HEART RATE: 62 BPM | SYSTOLIC BLOOD PRESSURE: 101 MMHG | DIASTOLIC BLOOD PRESSURE: 59 MMHG

## 2021-06-17 PROCEDURE — 99215 OFFICE O/P EST HI 40 MIN: CPT

## 2021-10-21 ENCOUNTER — APPOINTMENT (OUTPATIENT)
Dept: NEUROLOGY | Facility: CLINIC | Age: 71
End: 2021-10-21
Payer: MEDICARE

## 2021-10-21 VITALS
HEART RATE: 73 BPM | SYSTOLIC BLOOD PRESSURE: 128 MMHG | BODY MASS INDEX: 22.31 KG/M2 | DIASTOLIC BLOOD PRESSURE: 72 MMHG | WEIGHT: 130 LBS

## 2021-10-21 VITALS — DIASTOLIC BLOOD PRESSURE: 72 MMHG | SYSTOLIC BLOOD PRESSURE: 110 MMHG | HEIGHT: 64 IN | HEART RATE: 84 BPM

## 2021-10-21 PROCEDURE — 99215 OFFICE O/P EST HI 40 MIN: CPT

## 2021-10-21 NOTE — HISTORY OF PRESENT ILLNESS
[FreeTextEntry1] : 70 yo RH female patient was diagnosed with Parkinson's disease in 2019 with family h/o of PD (mother had PD with autonomic dysfunction) with PD symptoms since 2016, with loss of dexterity, changes in handwriting, and more recently tremor in the right hand.\par She was diagnosed with PD after a MRI and Muna Scan done a few months ago in early 2019, and she has been on Sinemet 25/100 3 x day for less than 3 months.\par \par She was last seen in July 2019, and she has followed since with another neurologist, who changed 2 of her 3 doses of Sinemet to ER.\par She has had a mild progression since, and she remains independent.\par Continue current regimen at this time, she has no fluctuations and no dyskinesias.\par She still has some hypotension and OH.\par \par She continues to have GI problems,with colitis and previous surgery cholecystotomy 9 years ago, complicated, and ruptured appendix 20 years ago, , and may have surgery for adhesion in her intestine, with R lower abdominal pain.\par \par She is exercising regularly.\par \par Her main problem is that she has been unable to sleep for several days, although at times she sleeps well.\par She has had several hours of lack of sleep, she took Benadryl, which did not help, tried liquid marijuana.\par \par Medications:\par Levothyroxine 125 mg qd\par - Sinemet IR 25./100 at 8.30 \par - CR 25/100 at 1.30 PM and at 6.30\par

## 2021-10-21 NOTE — ASSESSMENT
[FreeTextEntry1] : 72 yo RH female patient was diagnosed with Parkinson's disease in 2019 with family h/o of PD (mother had PD with autonomic dysfunction) with PD symptoms since 2016, with loss of dexterity, changes in handwriting, and more recently tremor in the right hand.\par She was diagnosed with PD after a MRI and Muna Scan done a few months ago in early 2019, and she has been on Sinemet 25/100 3 x day for less than 3 months.\par \par She was last seen in July 2019, and she has followed since with another neurologist, who changed 2 of her 3 doses of Sinemet to ER. She has had a mild progression since, and she remains independent.\par Continue current regimen at this time, she has no fluctuations and no dyskinesias.\par She still has some hypotension and OH.\par \par She continues to have GI problems,with colitis and previous surgery cholecystotomy 9 years ago, complicated, and ruptured appendix 20 years ago, , and may have surgery for adhesion in her intestine, with R lower abdominal pain. She is exercising regularly.\par \par Her main problem is that she has been unable to sleep for several days, although at times she sleeps well.\par She has had several hours of lack of sleep, she took Benadryl, which did not help, tried liquid marijuana.\par \par On exam there is no obvious change, although her perception is that she is feeling worse, because of her tiredness due to the lack of sleep.\par \par A/Plan:\par Discussed Melatonin 6 mg ER at bedtime\par May add Clonazepam 0.5 mg if melatonin not effective\par Continue current regimen\par Levothyroxine 125 mg qd\par - Sinemet IR 25./100 at 8.30 \par - CR 25/100 at 1.30 PM and at 6.30

## 2021-10-21 NOTE — PHYSICAL EXAM
[FreeTextEntry1] : \par Patient with normal cognitive function, mild hypomimia with diminished blinking, mild reduction of voice volume but no significant dysarthria.\par There is mild rigidity with some cogwheeling, present bilaterally, more evident on the left side.\par Mild intermittent resting tremor is present in the left hand, and occasionally in the right.\par There is mild loss of motor dexterity, with decrement at rapid sequential and rapid alternating movements, also slightly more evident on the left side.\par Foot tapping is slightly impaired, also with the left side more affected.\par Posture is mildly stooped, with some shortening of gait stride and mild asymmetry with left reduction, and decrease arm swing, more evident on the left side.\par Postural reflexes are normal.\par No obvious changes from last exam

## 2021-10-23 ENCOUNTER — RESULT REVIEW (OUTPATIENT)
Age: 71
End: 2021-10-23

## 2021-12-07 ENCOUNTER — APPOINTMENT (OUTPATIENT)
Dept: DERMATOLOGY | Facility: CLINIC | Age: 71
End: 2021-12-07
Payer: MEDICARE

## 2021-12-07 PROCEDURE — 17000 DESTRUCT PREMALG LESION: CPT

## 2021-12-07 PROCEDURE — 99213 OFFICE O/P EST LOW 20 MIN: CPT | Mod: 25

## 2021-12-07 PROCEDURE — 17003 DESTRUCT PREMALG LES 2-14: CPT

## 2021-12-29 ENCOUNTER — APPOINTMENT (OUTPATIENT)
Dept: NEUROLOGY | Facility: CLINIC | Age: 71
End: 2021-12-29
Payer: MEDICARE

## 2021-12-29 PROCEDURE — 99215 OFFICE O/P EST HI 40 MIN: CPT | Mod: 95

## 2022-01-10 ENCOUNTER — TRANSCRIPTION ENCOUNTER (OUTPATIENT)
Age: 72
End: 2022-01-10

## 2022-01-27 ENCOUNTER — RX RENEWAL (OUTPATIENT)
Age: 72
End: 2022-01-27

## 2022-02-08 ENCOUNTER — NON-APPOINTMENT (OUTPATIENT)
Age: 72
End: 2022-02-08

## 2022-02-11 ENCOUNTER — OUTPATIENT (OUTPATIENT)
Dept: OUTPATIENT SERVICES | Facility: HOSPITAL | Age: 72
LOS: 1 days | End: 2022-02-11

## 2022-02-11 ENCOUNTER — APPOINTMENT (OUTPATIENT)
Dept: MRI IMAGING | Facility: CLINIC | Age: 72
End: 2022-02-11
Payer: MEDICARE

## 2022-02-11 ENCOUNTER — RESULT REVIEW (OUTPATIENT)
Age: 72
End: 2022-02-11

## 2022-02-11 DIAGNOSIS — G20 PARKINSON'S DISEASE: ICD-10-CM

## 2022-02-11 DIAGNOSIS — Z98.89 OTHER SPECIFIED POSTPROCEDURAL STATES: Chronic | ICD-10-CM

## 2022-02-11 PROCEDURE — 70551 MRI BRAIN STEM W/O DYE: CPT | Mod: 26

## 2022-03-03 ENCOUNTER — APPOINTMENT (OUTPATIENT)
Dept: NEUROLOGY | Facility: CLINIC | Age: 72
End: 2022-03-03
Payer: MEDICARE

## 2022-03-03 VITALS
HEIGHT: 64 IN | DIASTOLIC BLOOD PRESSURE: 72 MMHG | WEIGHT: 132 LBS | SYSTOLIC BLOOD PRESSURE: 122 MMHG | BODY MASS INDEX: 22.53 KG/M2 | HEART RATE: 61 BPM

## 2022-03-03 VITALS — DIASTOLIC BLOOD PRESSURE: 80 MMHG | SYSTOLIC BLOOD PRESSURE: 125 MMHG | HEART RATE: 75 BPM

## 2022-03-03 PROCEDURE — 99215 OFFICE O/P EST HI 40 MIN: CPT

## 2022-05-18 ENCOUNTER — NON-APPOINTMENT (OUTPATIENT)
Age: 72
End: 2022-05-18

## 2022-06-02 ENCOUNTER — NON-APPOINTMENT (OUTPATIENT)
Age: 72
End: 2022-06-02

## 2022-06-14 ENCOUNTER — APPOINTMENT (OUTPATIENT)
Dept: DERMATOLOGY | Facility: CLINIC | Age: 72
End: 2022-06-14
Payer: MEDICARE

## 2022-06-14 PROCEDURE — 99213 OFFICE O/P EST LOW 20 MIN: CPT

## 2022-06-30 ENCOUNTER — APPOINTMENT (OUTPATIENT)
Dept: NEUROLOGY | Facility: CLINIC | Age: 72
End: 2022-06-30

## 2022-06-30 VITALS
DIASTOLIC BLOOD PRESSURE: 70 MMHG | SYSTOLIC BLOOD PRESSURE: 123 MMHG | BODY MASS INDEX: 21.34 KG/M2 | WEIGHT: 125 LBS | HEART RATE: 59 BPM | HEIGHT: 64 IN

## 2022-06-30 PROCEDURE — 99215 OFFICE O/P EST HI 40 MIN: CPT

## 2022-06-30 NOTE — HISTORY OF PRESENT ILLNESS
[FreeTextEntry1] : 70 yo RH female patient was diagnosed with Parkinson's disease in 2019 with family h/o of PD (mother had PD with autonomic dysfunction) with PD symptoms since 2016, with loss of dexterity, changes in handwriting, and more recently tremor in the right hand.\par She was diagnosed with PD after a MRI and Muna Scan done a few months ago in early 2019, and she has been on Sinemet 25/100 3 x day for less than 3 months.\par \par She reports that her main concern is worsening of her vision, especially in the R eye, she saw her ophthalmologist, who ordered a MRI, which reports " advanced chronic leukoencephalopathy, which has progressed since previous MRI.\par \par She continues to have GI problems,with colitis and previous surgery cholecystotomy 9 years ago, complicated, and ruptured appendix 20 years ago, , and may have surgery for adhesion in her intestine, with R lower abdominal pain. She is exercising regularly.\par \par She states her sleep has improved and she did not feel the need to start Melatonin. \par \par On exam there is no obvious change in balance or gait.

## 2022-06-30 NOTE — PHYSICAL EXAM
[FreeTextEntry1] : \par 72 yo RH female patient was diagnosed with Parkinson's disease in 2019 with family h/o of PD (mother had PD with autonomic dysfunction) with PD symptoms since 2016, with loss of dexterity, changes in handwriting, and more recently tremor in the right hand.\par She was diagnosed with PD after a MRI and Muna Scan done a few months ago in early 2019, and she has been on Sinemet 25/100 3 x day for less than 3 months.\par \par She continues to have GI problems,with colitis and previous surgery cholecystotomy 9 years ago, complicated, and ruptured appendix 20 years ago, , and may have surgery for adhesion in her intestine, with R lower abdominal pain. She is exercising regularly.\par \par She states her sleep has improved and she did not feel the need to start Melatonin. \par \par On exam there is no obvious change in balance or gait.

## 2022-06-30 NOTE — ASSESSMENT
[FreeTextEntry1] : 70 yo RH female patient was diagnosed with Parkinson's disease in 2019 with family h/o of PD (mother had PD with autonomic dysfunction) with PD symptoms since 2016, with loss of dexterity, changes in handwriting, and more recently tremor in the right hand.\par She was diagnosed with PD after a MRI and Muna Scan done a few months ago in early 2019, and she has been on Sinemet 25/100 3 x day for less than 3 months.\par \par \par She continues to have GI problems,with colitis and previous surgery cholecystotomy 9 years ago, complicated, and ruptured appendix 20 years ago, , and may have surgery for adhesion in her intestine, with R lower abdominal pain. She is exercising regularly.\par \par She states her sleep has improved and she did not feel the need to start Melatonin. \par \par On exam there is no obvious change in balance or gait.\par \par A/Plan:\par No evidence of progression, but with MRI revealing white matter disease.\par Start Sinemet ER 25/100 qhs  , she has not done it, although was suggested at previous visit.\par Sinemet IR 25/100 increase to tid, at 8: 30 am, 1 .30 pm, 6.30 pm, \par Levothyroxine 125 mg qd\par \par Refer to vascular Dr. Mendes\par Encouraged to increase exercise and physical activity and maintain an active social and intellectual life.\par

## 2022-07-25 ENCOUNTER — APPOINTMENT (OUTPATIENT)
Dept: NEUROLOGY | Facility: CLINIC | Age: 72
End: 2022-07-25

## 2022-07-25 PROCEDURE — 99215 OFFICE O/P EST HI 40 MIN: CPT | Mod: 95

## 2022-09-06 ENCOUNTER — RESULT CHARGE (OUTPATIENT)
Age: 72
End: 2022-09-06

## 2022-09-06 ENCOUNTER — APPOINTMENT (OUTPATIENT)
Dept: UROGYNECOLOGY | Facility: CLINIC | Age: 72
End: 2022-09-06

## 2022-09-06 DIAGNOSIS — R32 UNSPECIFIED URINARY INCONTINENCE: ICD-10-CM

## 2022-09-06 DIAGNOSIS — E07.9 DISORDER OF THYROID, UNSPECIFIED: ICD-10-CM

## 2022-09-06 DIAGNOSIS — Z85.820 PERSONAL HISTORY OF MALIGNANT MELANOMA OF SKIN: ICD-10-CM

## 2022-09-06 DIAGNOSIS — Z63.5 DISRUPTION OF FAMILY BY SEPARATION AND DIVORCE: ICD-10-CM

## 2022-09-06 DIAGNOSIS — K58.9 IRRITABLE BOWEL SYNDROME W/OUT DIARRHEA: ICD-10-CM

## 2022-09-06 DIAGNOSIS — Z87.09 PERSONAL HISTORY OF OTHER DISEASES OF THE RESPIRATORY SYSTEM: ICD-10-CM

## 2022-09-06 DIAGNOSIS — R39.15 URGENCY OF URINATION: ICD-10-CM

## 2022-09-06 DIAGNOSIS — Z82.3 FAMILY HISTORY OF STROKE: ICD-10-CM

## 2022-09-06 DIAGNOSIS — K59.00 CONSTIPATION, UNSPECIFIED: ICD-10-CM

## 2022-09-06 LAB
BILIRUB UR QL STRIP: NEGATIVE
CLARITY UR: CLEAR
COLLECTION METHOD: NORMAL
GLUCOSE UR-MCNC: NEGATIVE
HCG UR QL: 0.2 EU/DL
HGB UR QL STRIP.AUTO: NEGATIVE
KETONES UR-MCNC: NEGATIVE
LEUKOCYTE ESTERASE UR QL STRIP: NEGATIVE
NITRITE UR QL STRIP: NEGATIVE
PH UR STRIP: 6
PROT UR STRIP-MCNC: NEGATIVE
SP GR UR STRIP: 1.02

## 2022-09-06 PROCEDURE — 51701 INSERT BLADDER CATHETER: CPT

## 2022-09-06 PROCEDURE — 81003 URINALYSIS AUTO W/O SCOPE: CPT | Mod: QW

## 2022-09-06 PROCEDURE — 99204 OFFICE O/P NEW MOD 45 MIN: CPT | Mod: 25

## 2022-09-06 RX ORDER — DENOSUMAB 60 MG/ML
60 INJECTION SUBCUTANEOUS
Refills: 0 | Status: ACTIVE | COMMUNITY

## 2022-09-06 RX ORDER — FAMOTIDINE 20 MG/1
20 TABLET, FILM COATED ORAL
Refills: 0 | Status: ACTIVE | COMMUNITY

## 2022-09-06 RX ORDER — DICYCLOMINE HYDROCHLORIDE 10 MG/1
10 CAPSULE ORAL
Refills: 0 | Status: ACTIVE | COMMUNITY

## 2022-09-06 SDOH — SOCIAL STABILITY - SOCIAL INSECURITY: DISRUPTION OF FAMILY BY SEPARATION AND DIVORCE: Z63.5

## 2022-09-06 NOTE — PHYSICAL EXAM
[Chaperone Present] : A chaperone was present in the examining room during all aspects of the physical examination [No Acute Distress] : in no acute distress [Well developed] : well developed [Well Nourished] : ~L well nourished [Oriented x3] : oriented to person, place, and time [No Edema] : ~T edema was not present [Scar] : a scar was noted [Warm and Dry] : was warm and dry to touch [Normal Gait] : gait was normal [Normal Appearance] : general appearance was normal [Atrophy] : atrophy [2] : 2 [Aa ____] : Aa [unfilled] [Ba ____] : Ba [unfilled] [C ____] : C [unfilled] [GH ____] : GH [unfilled] [PB ____] : PB [unfilled] [TVL ____] : TVL  [unfilled] [Ap ____] : Ap [unfilled] [Bp ____] : Bp [unfilled] [D ____] : D [unfilled] [Normal] : no abnormalities [Post Void Residual ____ml] : post void residual was [unfilled] ml [Cough] : no cough [Tenderness] : ~T no ~M abdominal tenderness observed [Distended] : not distended [Hernia] : no hernia observed [No Lesions] : no lesions were seen on the external genitalia [de-identified] : Loss of clitoral pierre architecture

## 2022-09-06 NOTE — OB HISTORY
[Vaginal ___] : [unfilled] vaginal delivery(s) [Approximately ___ (Month)] : the LMP was approximately [unfilled] month(s) ago [Last Pap Smear ___] : date of last pap smear was on [unfilled] [Abnormal Pap Smear] : normal pap smear [Taking Estrogens] : is not taking estrogen replacement [Sexually Active] : is not sexually active [FreeTextEntry1] : Largest baby 7#6oz.

## 2022-09-06 NOTE — DISCUSSION/SUMMARY
[FreeTextEntry1] : Ms. 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 We discussed using a small amount of vaginal estrogen and the systemic side effects. i printed out some literature for her on this. We discussed the anatomy and how the appearance of her vulvar tissue suggests to me that she may have a vulvar dermatologic issues. I refereed her to Yessica Alvarez to evaluate. I asked her to come back in about 1 month o see how she is doing. I was able to answer all of her questions.

## 2022-09-06 NOTE — LETTER BODY
[Dear  ___] : Dear  [unfilled], [I had the pleasure of evaluating your patient, [unfilled]. Thank you for referring Ms. [unfilled] for consultation for ___] : I had the pleasure of evaluating your patient, [unfilled]. Thank you for referring Ms. [unfilled] for consultation for [unfilled]. [Attached please find my note.] : Attached please find my note. [Thank you very much for allowing me to participate in the care of this patient. If you have any questions, please do not hesitate to contact me] : Thank you very much for allowing me to participate in the care of this patient. If you have any questions, please do not hesitate to contact me. [DrJennifer  ___] : Dr. LANE

## 2022-09-06 NOTE — REASON FOR VISIT
[Painful Urination] : painful urination [Bladder Pain] : bladder pain [Urinary Incontinence] : urinary incontinence

## 2022-09-06 NOTE — HISTORY OF PRESENT ILLNESS
[FreeTextEntry1] : 70 yo  female with complaints of urethral pain. This started about a 1 year ago. She has burning with urination and the pain is also without any warning. She has been seeing an acupuncturist for years but about 2 weeks ago the acupuncturist worked on this urethral pain and this pain has resolved. She has Parkinsons. She also has colitis and tends towards lose stools and will have bowel accidents. Has a little leaking of urine that occurs with urge. Does leak with sneeze or cough. She is not wearing pads. Was using Desitin when this started and it helped but then stopped working so she stopped using it. Was given a vaginal steroid cream by GYN but that did not help. Gets up 3 times at night to urinate. She does occasionally feel she voids frequently. She feels she empties her bladder for the most part.

## 2022-09-08 ENCOUNTER — NON-APPOINTMENT (OUTPATIENT)
Age: 72
End: 2022-09-08

## 2022-09-09 ENCOUNTER — APPOINTMENT (OUTPATIENT)
Dept: NEUROLOGY | Facility: CLINIC | Age: 72
End: 2022-09-09

## 2022-09-09 PROCEDURE — 99215 OFFICE O/P EST HI 40 MIN: CPT | Mod: 95

## 2022-09-16 ENCOUNTER — NON-APPOINTMENT (OUTPATIENT)
Age: 72
End: 2022-09-16

## 2022-09-19 ENCOUNTER — APPOINTMENT (OUTPATIENT)
Dept: NEUROLOGY | Facility: CLINIC | Age: 72
End: 2022-09-19

## 2022-09-19 VITALS
HEIGHT: 64 IN | BODY MASS INDEX: 21.85 KG/M2 | WEIGHT: 128 LBS | DIASTOLIC BLOOD PRESSURE: 70 MMHG | HEART RATE: 63 BPM | SYSTOLIC BLOOD PRESSURE: 124 MMHG | RESPIRATION RATE: 16 BRPM

## 2022-09-19 DIAGNOSIS — I67.9 CEREBROVASCULAR DISEASE, UNSPECIFIED: ICD-10-CM

## 2022-09-19 DIAGNOSIS — H54.61 UNQUALIFIED VISUAL LOSS, RIGHT EYE, NORMAL VISION LEFT EYE: ICD-10-CM

## 2022-09-19 PROCEDURE — 99215 OFFICE O/P EST HI 40 MIN: CPT

## 2022-09-19 RX ORDER — PANTOPRAZOLE SODIUM 40 MG/1
40 GRANULE, DELAYED RELEASE ORAL
Refills: 0 | Status: ACTIVE | COMMUNITY

## 2022-09-28 ENCOUNTER — NON-APPOINTMENT (OUTPATIENT)
Age: 72
End: 2022-09-28

## 2022-09-28 PROCEDURE — 99442: CPT | Mod: NC,95

## 2022-10-06 ENCOUNTER — APPOINTMENT (OUTPATIENT)
Dept: UROGYNECOLOGY | Facility: CLINIC | Age: 72
End: 2022-10-06

## 2022-11-16 ENCOUNTER — APPOINTMENT (OUTPATIENT)
Dept: UROGYNECOLOGY | Facility: CLINIC | Age: 72
End: 2022-11-16

## 2022-11-16 VITALS — DIASTOLIC BLOOD PRESSURE: 76 MMHG | SYSTOLIC BLOOD PRESSURE: 128 MMHG

## 2022-11-16 DIAGNOSIS — L98.9 DISORDER OF THE SKIN AND SUBCUTANEOUS TISSUE, UNSPECIFIED: ICD-10-CM

## 2022-11-16 DIAGNOSIS — N36.2 URETHRAL CARUNCLE: ICD-10-CM

## 2022-11-16 PROCEDURE — 51798 US URINE CAPACITY MEASURE: CPT

## 2022-11-16 PROCEDURE — 99215 OFFICE O/P EST HI 40 MIN: CPT

## 2022-11-16 RX ORDER — MESALAMINE 1.2 G/1
1.2 TABLET, DELAYED RELEASE ORAL
Qty: 360 | Refills: 0 | Status: ACTIVE | COMMUNITY
Start: 2022-10-31

## 2022-11-16 RX ORDER — NYSTATIN AND TRIAMCINOLONE ACETONIDE 100000; 1 MG/G; MG/G
100000-0.1 CREAM TOPICAL
Qty: 30 | Refills: 0 | Status: COMPLETED | COMMUNITY
Start: 2019-09-11 | End: 2022-11-16

## 2022-11-16 RX ORDER — TERCONAZOLE 4 MG/G
0.4 CREAM VAGINAL
Qty: 45 | Refills: 0 | Status: COMPLETED | COMMUNITY
Start: 2019-09-20 | End: 2022-11-16

## 2022-11-16 RX ORDER — METRONIDAZOLE 7.5 MG/G
0.75 GEL VAGINAL
Qty: 70 | Refills: 0 | Status: COMPLETED | COMMUNITY
Start: 2019-08-08 | End: 2022-11-16

## 2022-11-16 RX ORDER — CIPROFLOXACIN HYDROCHLORIDE 250 MG/1
250 TABLET, FILM COATED ORAL
Qty: 10 | Refills: 0 | Status: COMPLETED | COMMUNITY
Start: 2019-11-08 | End: 2022-11-16

## 2022-11-16 RX ORDER — TRAMADOL HYDROCHLORIDE 50 MG/1
50 TABLET, COATED ORAL
Refills: 0 | Status: COMPLETED | COMMUNITY
End: 2022-11-16

## 2022-11-16 RX ORDER — FLUCONAZOLE 150 MG/1
150 TABLET ORAL
Qty: 2 | Refills: 0 | Status: COMPLETED | COMMUNITY
Start: 2019-08-19 | End: 2022-11-16

## 2022-11-16 RX ORDER — METRONIDAZOLE 250 MG/1
250 TABLET ORAL
Qty: 56 | Refills: 0 | Status: ACTIVE | COMMUNITY
Start: 2022-11-11

## 2022-11-16 RX ORDER — CEFPODOXIME PROXETIL 200 MG/1
200 TABLET, FILM COATED ORAL
Qty: 14 | Refills: 0 | Status: COMPLETED | COMMUNITY
Start: 2019-08-19 | End: 2022-11-16

## 2022-11-16 RX ORDER — CHLORDIAZEPOXIDE HYDROCHLORIDE AND CLIDINIUM BROMIDE 5; 2.5 MG/1; MG/1
5-2.5 CAPSULE, GELATIN COATED ORAL
Qty: 60 | Refills: 0 | Status: COMPLETED | COMMUNITY
Start: 2019-10-29 | End: 2022-11-16

## 2022-11-16 RX ORDER — PHENAZOPYRIDINE HYDROCHLORIDE 200 MG/1
200 TABLET ORAL
Qty: 6 | Refills: 0 | Status: COMPLETED | COMMUNITY
Start: 2019-08-19 | End: 2022-11-16

## 2022-11-16 RX ORDER — NITROFURANTOIN (MONOHYDRATE/MACROCRYSTALS) 25; 75 MG/1; MG/1
100 CAPSULE ORAL
Qty: 14 | Refills: 0 | Status: COMPLETED | COMMUNITY
Start: 2019-09-11 | End: 2022-11-16

## 2022-11-16 RX ORDER — CHOLESTYRAMINE
POWDER (GRAM) MISCELLANEOUS
Refills: 0 | Status: COMPLETED | COMMUNITY
End: 2022-11-16

## 2022-11-16 RX ORDER — BRIMONIDINE TARTRATE 1 MG/ML
0.1 SOLUTION/ DROPS OPHTHALMIC
Qty: 10 | Refills: 0 | Status: ACTIVE | COMMUNITY
Start: 2022-10-18

## 2022-11-16 RX ORDER — SUCRALFATE 1 G/10ML
1 SUSPENSION ORAL
Qty: 600 | Refills: 0 | Status: COMPLETED | COMMUNITY
Start: 2019-09-05 | End: 2022-11-16

## 2022-11-16 RX ORDER — DIAZEPAM 10 MG/1
10 TABLET ORAL
Refills: 0 | Status: COMPLETED | COMMUNITY
End: 2022-11-16

## 2022-11-16 RX ORDER — CEPHALEXIN 500 MG/1
500 CAPSULE ORAL
Qty: 14 | Refills: 0 | Status: COMPLETED | COMMUNITY
Start: 2019-08-11 | End: 2022-11-16

## 2022-11-16 RX ORDER — IBANDRONATE SODIUM 150 MG/1
TABLET, FILM COATED ORAL
Refills: 0 | Status: COMPLETED | COMMUNITY
End: 2022-11-16

## 2022-11-16 RX ORDER — PANTOPRAZOLE 40 MG/1
40 TABLET, DELAYED RELEASE ORAL
Refills: 0 | Status: COMPLETED | COMMUNITY
End: 2022-11-16

## 2022-11-17 ENCOUNTER — APPOINTMENT (OUTPATIENT)
Dept: NEUROLOGY | Facility: CLINIC | Age: 72
End: 2022-11-17

## 2022-11-17 VITALS — SYSTOLIC BLOOD PRESSURE: 120 MMHG | DIASTOLIC BLOOD PRESSURE: 70 MMHG | HEART RATE: 68 BPM

## 2022-11-17 VITALS
WEIGHT: 130 LBS | BODY MASS INDEX: 22.2 KG/M2 | HEIGHT: 64 IN | HEART RATE: 64 BPM | DIASTOLIC BLOOD PRESSURE: 72 MMHG | SYSTOLIC BLOOD PRESSURE: 124 MMHG

## 2022-11-17 DIAGNOSIS — F41.9 ANXIETY DISORDER, UNSPECIFIED: ICD-10-CM

## 2022-11-17 LAB
APPEARANCE: CLEAR
BACTERIA: NEGATIVE
BILIRUBIN URINE: NEGATIVE
BLOOD URINE: NEGATIVE
COLOR: YELLOW
GLUCOSE QUALITATIVE U: NEGATIVE
HYALINE CASTS: 1 /LPF
KETONES URINE: NEGATIVE
LEUKOCYTE ESTERASE URINE: NEGATIVE
MICROSCOPIC-UA: NORMAL
NITRITE URINE: NEGATIVE
PH URINE: 7.5
PROTEIN URINE: NORMAL
RED BLOOD CELLS URINE: 1 /HPF
SPECIFIC GRAVITY URINE: 1.02
SQUAMOUS EPITHELIAL CELLS: 0 /HPF
UROBILINOGEN URINE: NORMAL
WHITE BLOOD CELLS URINE: 0 /HPF

## 2022-11-17 PROCEDURE — 93886 INTRACRANIAL COMPLETE STUDY: CPT

## 2022-11-17 PROCEDURE — 99215 OFFICE O/P EST HI 40 MIN: CPT

## 2022-11-17 PROCEDURE — 93880 EXTRACRANIAL BILAT STUDY: CPT

## 2022-11-17 PROCEDURE — 93892 TCD EMBOLI DETECT W/O INJ: CPT

## 2022-11-17 NOTE — PHYSICAL EXAM
[FreeTextEntry1] : Patient with normal cognitive function, mild hypomimia with diminished blinking, mild reduction of voice volume but no significant dysarthria.\par There is mild rigidity with some cogwheeling, present bilaterally, more evident on the left side.\par Mild intermittent resting tremor is present in the left hand, and occasionally in the right. There is a mild intermittent R leg tremor.\par There is mild loss of motor dexterity, with decrement at rapid sequential and rapid alternating movements, also slightly more evident on the left side.\par Foot tapping is slightly impaired, also with the left side more affected.\par Posture is mildly stooped, with some shortening of gait stride and mild asymmetry with left reduction, and decrease arm swing, more evident on the left side.\par Postural reflexes are normal.\par No obvious changes from last exam.

## 2022-11-17 NOTE — DISCUSSION/SUMMARY
[FreeTextEntry1] : 73 yo RH female patient was diagnosed with Parkinson's disease in 2019 with family h/o of PD (mother had PD with autonomic dysfunction) with PD symptoms since 2016, with loss of dexterity, changes in handwriting, and more recently tremor in the right hand.\par She was diagnosed with PD after a MRI and Muna Scan done a few months ago in early 2019, and she has been on Sinemet 25/100 3 x day for less than 3 months.\par \par She continues to have GI problems,with colitis and previous surgery cholecystotomy 9 years ago, complicated, and ruptured appendix 20 years ago, , and may have surgery for adhesion in her intestine, with R lower abdominal pain. She is exercising regularly.\par On exam there is no obvious change in balance or gait.\par Her main issue today is dizziness, lightheadedness several days a week.Pt denies any near syncopal episodes related to OH.\par \par Plan:\par - Discuss about medications for anxiety. Prescribed Xanax 0.5 mg 1/2 tab as needed for anxiety. Instruct not to use it before driving due to its sedative effects which will be not safe during driving.  \par - Continue orthostatic BP diary. Discussed about Midodrine. (She took it before, prescribed in 8/2019). At this time patient prefers to manage it conservatively. \par - Discussed the importance of increasing hydration, salt intake, taking time when changing positions\par - Continue Sinemet ER 25/100 QHS 1.30 pm and 10 pm\par - Continue Sinemet IR 25/100 TID at 8: 30 am and 6.30 pm, \par - Continue Levothyroxine 125 mg qd\par - Continue medicine for colitis prescribed by GI\par - Continue exercise. \par \par Encouraged to increase exercise and physical activity and maintain an active social and intellectual life.\par \par More than 50% of the visit were dedicated to review of treatment, therapeutic plan, and prognosis, and patient was counseled on coping and physical and emotional wellbeing.\par

## 2022-11-17 NOTE — HISTORY OF PRESENT ILLNESS
[FreeTextEntry1] : 73 yo RH female patient was diagnosed with Parkinson's disease in 2019 with family h/o of PD (mother had PD with autonomic dysfunction) with PD symptoms since 2016, with loss of dexterity, changes in handwriting, and more recently tremor in the right hand.She was diagnosed with PD after a MRI and Muna Scan done a few months ago in early 2019, and she has been on Sinemet 25/100 3 x day for less than 3 months.\par \par Since the last visit, she was hospitalized from Sept 12- 14 due to ischemic colitis and dehydration. Currently taking medications- metronidazole and Rifaxan, doing well. Since last visit, she is doing well in terms of PD symptoms. Tremors are better, slowness did not get worse. She thinks she is getting older, she is anxious during driving, she mixes medicines of PD and colitis and it made her worried. \par \par She denies any shuffling and tries to avoid as much as she can. She has states her R leg drags occasionally. Pt denies any FOG or start hesitation. Pt denies any difficulty with transfers; occasionally may push herself up. Pt ambulates independently without assistive device inside or outside the home. Pt denies any falls. Pt states her tremor of R hand occasionally; slight worsening. She states she goes to acupuncture to help with tremor and notices improvement. Pt states she has started to notice mild intermittent R leg tremor. Pt denies any tremor of L leg and L hand. Pt has noticed some difficulty and slowing of speed with fine motor movements but she is independent. Pt states she is still able to perform all ADLs independently. Pt denies any dyskinesias. She denies any wearing OFF. \par \par Pt states she experiences dizziness and lightheadedness about 4- 5 x a week. Pt denies any lightheadedness and dizziness, Pt uses a wedge in the bed and increased her salt intake. Pt denies any near syncopal episodes related to OH. BP today is 124/72, HR 68  in sitting and 120/70, HR 64 in standing. \par \par Pt walks 7000 steps a day (She used to walks 1000 steps/day, reduced after hospitalization for colitis). \par Pt works with a  3 x a week. \par Pt does chi gong 30 minutes every day.\par Pt does acupuncture 3 x a week.\par \par Pt states sleep is normal with about 6- 7 hrs a night. Pt denies any nightmares or vivid dreams. Pt denies any acting out or screaming in her sleep. Pt uses marijuana gummies 1/2 almost every night.\par Pt reports a worsening of bloating. Pt states she experiences ulcerative colitis attacks (last time was 1 week ago and before that 6 months ago). Pt states there are days she experiences diarrhea and constipation. Pt states she has experienced bright red blood in her stool; normal for her due to internal hemorrhoids. She is followed by GI. She is currently taking Imodium PRN\par Pt states she experiences occasional urinary urgency and frequency during the day. Pt reports occasional and cyclic periods of urinary incontinence about 1 x a month. Pt states she gets up 2- 3 x a night.\par Pt states she has had recent changes in vision. She difficulty with blurry and worsening of vision when reading. She is getting progressive glasses and making changes with glasses. Pt has a history of glaucoma and macular degeneration. Pt had bilateral cataract surgery; 4/22 and 4/29. \par Pt states mood is great.\par Pt experiences occasionally daytime somnolence. Pt takes a nap a day.\par \par Meds :\par Metronidazole\par Rifaxan \par Sinemet ER \par Sinemet IR\par \par

## 2022-11-18 PROBLEM — L98.9 DERMATOSIS OF EXTERNAL GENITALIA: Status: ACTIVE | Noted: 2022-11-18

## 2022-11-18 PROBLEM — N36.2 URETHRAL CARUNCLE: Status: ACTIVE | Noted: 2022-11-18

## 2022-11-18 LAB — BACTERIA UR CULT: NORMAL

## 2022-11-18 NOTE — PHYSICAL EXAM
[No Lesions] : no lesions were seen on the external genitalia [Vulvar Atrophy] : vulvar atrophy [No Acute Distress] : in no acute distress [Well developed] : well developed [Well Nourished] : ~L well nourished [Good Hygeine] : demonstrates good hygeine [Oriented x3] : oriented to person, place, and time [Normal Memory] : ~T memory was ~L unimpaired [Normal Mood/Affect] : mood and affect are normal [No Edema] : ~T edema was not present [Warm and Dry] : was warm and dry to touch [Normal Gait] : gait was normal [Mucosal Prolapse] : had a mucosal prolapse [Atrophy] : atrophy [No Bleeding] : there was no active vaginal bleeding [Normal] : no abnormalities [Post Void Residual ____ml] : post void residual was [unfilled] ml [Exam Deferred] : was deferred [de-identified] : Q-tip touch test 5/10 @ 1,5,6,7,11. Derm changes c/s LS. No ulcerations, fissures, erosions.  [de-identified] : Loss of architecture b/l labia minora. 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 Hypopigmentation of labia minora, & perineum  [FreeTextEntry3] : Small urethral caruncle

## 2022-11-18 NOTE — HISTORY OF PRESENT ILLNESS
[FreeTextEntry1] : Vane is a very pleasant 73 yo F referred by Dr. Simon for ?vulvar derm\par \par Has parkinson's dx\par Has urethral pain, burning, stabbing started 2-3 years ago with RUTIs. Intermittent (flares and remission), when occurs can last "days" and can go weeks/months without any pain.\par Desitin worked for a bit but then stopped working. \par Was given premarin. [facility] gave her estrace to use ftp to vulva 3x/week. Thinks it may be helping. \par \par In hospital Sept 11-13 for ischemic colitis ([facility]).\par Dx'd with SIBO is now on Xifaxin and metronidazole. \par \par Denies daytime frequency, +nocturia x 2-3, no hematuria, occasional CECI, good flow, no hesitancy. No kidney stones. RUTIs 2-3 years ago however, she has not had another since that time.\par \par Not sexually active. \par Some vulvar burning which is improving with estrace. \par Paps wnl\par Mammograms wnl\par Menopause = age 48, no PMB\par No recurrent vaginal infections. \par No discharge, +itching, +burning.

## 2022-11-18 NOTE — ASSESSMENT
[FreeTextEntry1] : I had a long conversation with Vane regarding vLS, vulvovaginal burning, dysuria and urethral burning and PFD. I reviewed the pathologies, possible etiologies, diagnosis, symptoms and treatment options available. Written information was given to the patient.\par \par Increase estrace to ftp amt to introitus qhs. Amount and location of cream application was demonstrated to patient today in office via mirror demonstration. I explained that the cream does not work overnight and she needs to continue it for a minimum of 6-8 weeks before we re-evaluate efficacy.\par \par Vulvar health techniques rev'd in detail:\par -Warm baths x15-20 mins QD with 2 cups Epsom salt or Aveeno oatmeal\par -Unscented soaps, body washes, bath gels\par -No fabric softeners or dryer sheets on underwear\par -Unscented pads (NOT ALWAYS)\par -Ice to vulva\par -Hypoallergenic lubricants\par -White Crisco as needed\par \par Discussed vulvoscopy with vulvar bx at length. R/B/A rev'd. Written information given to patient to review. \par \par Referred to [facility] ([facility]). Rx in [employer]. Contact info given.\par \par PFM relaxation techniques rev'd in detail:\par -Warm baths x15-20 mins QD with 2 cups Epsom salt or Aveeno oatmeal\par -No pushing, straining\par -Avoid constipation\par       *Flax seed oil capsules; 2-3 capsules 2-3x/day (titrate as tolerated)\par       *Miralax\par       *Increase water intake\par -No Kegels, no squeezing\par \par V5 suppositories PV qhs. Vane understands that benzodiazepines are a CS, even in suppository form. She understands the potential for dependence/abuse, drug tolerance, potential for addiction. She understands that this class of drugs is habit-forming and [place] regulated. The sedative effects of benzos can be potentiated by taking alcohol, sleeping pills, opioids. The decision to drive is the patient's responsibility, as benzos can affect her driving ability and ability to concentrate. The goal is to wean off benzos. The patient verbalized understanding of everything we discussed and would like to continue with medication at this time. ISTOP checked. She also understands that in order to continue to get refills on her diazepam prescription, she needs to be seen in the office at minimum every 3 months.\par \par PVR - 0cc\par UA and C&S sent\par Trial pyridium 200mg po up to BID prn urethral burning/urinary symptoms. Take with food. Increase water intake.\par \par RTO vulvoscopy with bx\par

## 2022-11-21 ENCOUNTER — NON-APPOINTMENT (OUTPATIENT)
Age: 72
End: 2022-11-21

## 2022-11-22 LAB
A VAGINAE DNA VAG QL NAA+PROBE: NORMAL
BVAB2 DNA VAG QL NAA+PROBE: NORMAL
C KRUSEI DNA VAG QL NAA+PROBE: NEGATIVE
MEGA1 DNA VAG QL NAA+PROBE: NORMAL
T VAGINALIS RRNA SPEC QL NAA+PROBE: NEGATIVE

## 2023-01-04 ENCOUNTER — APPOINTMENT (OUTPATIENT)
Dept: UROGYNECOLOGY | Facility: CLINIC | Age: 73
End: 2023-01-04
Payer: MEDICARE

## 2023-01-04 VITALS — DIASTOLIC BLOOD PRESSURE: 68 MMHG | TEMPERATURE: 97.9 F | SYSTOLIC BLOOD PRESSURE: 105 MMHG

## 2023-01-04 PROCEDURE — 99214 OFFICE O/P EST MOD 30 MIN: CPT | Mod: 25

## 2023-01-04 PROCEDURE — 56821 COLPOSCOPY VULVA W/BIOPSY: CPT

## 2023-01-04 NOTE — PROCEDURE
[Vulvar Biopsy] : a Vulvar Biopsy [Other ___] : [unfilled] [Patient] : the patient [Other: ___] : [unfilled] [Consent Obtained] : written consent was obtained prior to the procedure and is detailed in the patient's record [None] : none [2% Lidocaine ___(ml)] :  [unfilled]Uml of 2% Lidocaine [Betadine] : betadine [Yes] : Specimen sent to Pathology [Specimen: ___] : Specimen: [unfilled] [No Complications] : none [Tolerated Well] : the patient tolerated the procedure well [Post procedure instructions and information given] : post procedure instructions and information given and reviewed with patient. [0] : 0 [FreeTextEntry1] : SEE SCANNED PROCEDURE NOTE AND NOTE BELOW

## 2023-01-04 NOTE — HISTORY OF PRESENT ILLNESS
[FreeTextEntry1] : Vane is here for a f/u visit. \par \par She thinks she may have a UTI. She endorses bladder pressure, nocturia and occasional CECI. She denies hematuria, fever, chills, increased frequency. \par She is requesting a UA and C&S. \par She has been taking pyridium 200mg 1 po QD for urinary symptoms with good results. She denies any further urethral burning.\par \par She has been using topical estrace ftp to introitus qhs with good results. She denies all vulvar burning, itching, irritation. \par \par She is using V5 suppositories PV qhs with excellent results. \par She is attempting to reschedule PFPT but they have not called her back yet.

## 2023-01-04 NOTE — PHYSICAL EXAM
[No Acute Distress] : in no acute distress [Well developed] : well developed [Well Nourished] : ~L well nourished [Good Hygeine] : demonstrates good hygeine [Oriented x3] : oriented to person, place, and time [Normal Memory] : ~T memory was ~L unimpaired [Normal Mood/Affect] : mood and affect are normal [No Edema] : ~T edema was not present [Warm and Dry] : was warm and dry to touch [Normal Gait] : gait was normal [No Lesions] : no lesions were seen on the external genitalia [Vulvar Atrophy] : vulvar atrophy [Atrophy] : atrophy [No Bleeding] : there was no active vaginal bleeding [Normal] : no abnormalities [Exam Deferred] : was deferred [de-identified] : Q-tip touch test 3/10 @ 1,5,6,7,11. Derm changes c/s LS. No ulcerations, fissures, erosions.  [de-identified] : Loss of architecture b/l labia minora. 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 Hypopigmentation of labia minora, & perineum  [FreeTextEntry3] : Urethral caruncle resolved

## 2023-01-04 NOTE — DISCUSSION/SUMMARY
[FreeTextEntry1] : UA and C&S sent today\par Continue pyridium 200mg 1 po QD as taking for urinary symptoms.\par \par Vane was consented for vulvoscopy with vulvar biopsy to be done in the office today. Risks and benefits rev'd in detail including but not limited to: infection, bleeding, scarring of vulva, increased pain. She verbalized understanding and consented to proceed with biopsy. \par \par Vulvar biopsy was done in the office today. \par 1.5cc 2% lidiocaine was injected to numb biopsy area(s).\par Vulva was cleaned with betadine.\par Biopsy was taken from area as noted on scanned photos with mini tischelrs and put in formalin container and sent to dermatopathology. Monsel's solution applied and hemostasis obtained.\par \par Patient tolerated procedure well without complaints/complications. Carly was instructed on the following:\par -avoid getting vulva wet x24 hour. -\par -after the first 24 hrs, she may shower, but she should avoid baths x3-5day.  \par -pelvic rest x 1week\par -do not to remove the "scab" formed by the Monsel's, let it fall off itself, usually within 3-5d\par -call the office with any increased pain, bleeding, discharge, odor, fever\par -stop all topical creams x 1 week\par -stop V5 suppositories x 1 week\par -may use ice packs for comfort\par -may apply thin coat of neosporin/bacitracin after scab falls off\par \par She verbalized understanding of all instructions and is going to f/u in the office in 2 weeks.\par Written instructions given to patient.\par \par Restart topical estradiol ftp to introitus qhs and V5 suppositories PV qhs in 1 week.\par \par RTO 2-3 weeks\par \par \par

## 2023-01-05 ENCOUNTER — NON-APPOINTMENT (OUTPATIENT)
Age: 73
End: 2023-01-05

## 2023-01-05 LAB
APPEARANCE: CLEAR
BACTERIA: NEGATIVE
BILIRUBIN URINE: NEGATIVE
BLOOD URINE: NEGATIVE
COLOR: NORMAL
GLUCOSE QUALITATIVE U: NEGATIVE
HYALINE CASTS: 1 /LPF
KETONES URINE: NEGATIVE
LEUKOCYTE ESTERASE URINE: NEGATIVE
MICROSCOPIC-UA: NORMAL
NITRITE URINE: NEGATIVE
PH URINE: 7.5
PROTEIN URINE: NEGATIVE
RED BLOOD CELLS URINE: 0 /HPF
SPECIFIC GRAVITY URINE: 1.01
SQUAMOUS EPITHELIAL CELLS: 1 /HPF
UROBILINOGEN URINE: NORMAL
WHITE BLOOD CELLS URINE: 0 /HPF

## 2023-01-06 LAB — BACTERIA UR CULT: NORMAL

## 2023-01-23 ENCOUNTER — APPOINTMENT (OUTPATIENT)
Dept: DERMATOLOGY | Facility: CLINIC | Age: 73
End: 2023-01-23
Payer: MEDICARE

## 2023-01-23 PROCEDURE — 99213 OFFICE O/P EST LOW 20 MIN: CPT | Mod: 25

## 2023-01-23 PROCEDURE — 17000 DESTRUCT PREMALG LESION: CPT

## 2023-01-25 ENCOUNTER — OFFICE (OUTPATIENT)
Dept: URBAN - METROPOLITAN AREA CLINIC 115 | Facility: CLINIC | Age: 73
Setting detail: OPHTHALMOLOGY
End: 2023-01-25
Payer: MEDICARE

## 2023-01-25 DIAGNOSIS — H50.00: ICD-10-CM

## 2023-01-25 DIAGNOSIS — H40.033: ICD-10-CM

## 2023-01-25 DIAGNOSIS — H47.333: ICD-10-CM

## 2023-01-25 DIAGNOSIS — H02.831: ICD-10-CM

## 2023-01-25 DIAGNOSIS — H21.543: ICD-10-CM

## 2023-01-25 DIAGNOSIS — H02.834: ICD-10-CM

## 2023-01-25 DIAGNOSIS — H53.433: ICD-10-CM

## 2023-01-25 PROCEDURE — 92083 EXTENDED VISUAL FIELD XM: CPT | Performed by: OPHTHALMOLOGY

## 2023-01-25 PROCEDURE — 99213 OFFICE O/P EST LOW 20 MIN: CPT | Performed by: OPHTHALMOLOGY

## 2023-01-25 PROCEDURE — 92133 CPTRZD OPH DX IMG PST SGM ON: CPT | Performed by: OPHTHALMOLOGY

## 2023-01-25 ASSESSMENT — LID POSITION - DERMATOCHALASIS
OS_DERMATOCHALASIS: LUL 1+ 2+
OD_DERMATOCHALASIS: RUL 1+ 2+

## 2023-01-25 ASSESSMENT — REFRACTION_AUTOREFRACTION
OD_AXIS: 008
OD_SPHERE: +3.75
OS_SPHERE: +4.25
OD_CYLINDER: -1.00
OS_AXIS: 173
OS_CYLINDER: -1.00

## 2023-01-25 ASSESSMENT — REFRACTION_CURRENTRX
OD_VPRISM_DIRECTION: PROGS
OD_CYLINDER: -0.50
OS_SPHERE: +9.00
OD_OVR_VA: 20/
OS_AXIS: 167
OS_CYLINDER: -1.25
OS_OVR_VA: 20/
OS_VPRISM_DIRECTION: PROGS
OS_CYLINDER: -1.00
OS_OVR_VA: 20/
OS_VPRISM_DIRECTION: PROGS
OD_OVR_VA: 20/
OS_CYLINDER: -1.00
OD_ADD: +1.25
OD_SPHERE: +2.50
OS_SPHERE: +8.50
OD_CYLINDER: -2.25
OD_SPHERE: +2.50
OD_VPRISM_DIRECTION: PROGS
OD_AXIS: 027
OD_CYLINDER: -0.50
OD_AXIS: 179
OD_SPHERE: +8.50
OD_SPHERE: +8.50
OS_AXIS: 061
OD_ADD: +0.75
OD_OVR_VA: 20/
OS_CYLINDER: -0.75
OD_AXIS: 001
OS_SPHERE: +3.50
OS_AXIS: 171
OD_ADD: +1.50
OS_ADD: +1.25
OD_AXIS: 031
OD_CYLINDER: -2.25
OD_VPRISM_DIRECTION: PROGS
OS_OVR_VA: 20/
OS_AXIS: 164
OS_ADD: +1.50
OS_SPHERE: +3.25
OS_VPRISM_DIRECTION: PROGS

## 2023-01-25 ASSESSMENT — SPHEQUIV_DERIVED
OD_SPHEQUIV: 3.25
OD_SPHEQUIV: 3.25
OS_SPHEQUIV: 3.75
OS_SPHEQUIV: 3.5

## 2023-01-25 ASSESSMENT — CONFRONTATIONAL VISUAL FIELD TEST (CVF)
OS_FINDINGS: FULL
OD_FINDINGS: FULL

## 2023-01-25 ASSESSMENT — PACHYMETRY
OS_CT_CORRECTION: -1
OS_CT_UM: 552

## 2023-01-25 ASSESSMENT — REFRACTION_MANIFEST
OD_CYLINDER: -1.00
OD_SPHERE: +3.75
OS_AXIS: 170
OS_SPHERE: +4.25
OS_VA1: 20/40+3
OS_CYLINDER: -1.50
OD_VA1: 20/100
OD_AXIS: 010

## 2023-01-25 ASSESSMENT — VISUAL ACUITY
OD_BCVA: 20/60
OS_BCVA: 20/80-2

## 2023-01-25 ASSESSMENT — TONOMETRY
OD_IOP_MMHG: 16
OS_IOP_MMHG: 17

## 2023-01-27 ENCOUNTER — APPOINTMENT (OUTPATIENT)
Dept: UROGYNECOLOGY | Facility: CLINIC | Age: 73
End: 2023-01-27
Payer: MEDICARE

## 2023-01-27 VITALS
TEMPERATURE: 97.7 F | DIASTOLIC BLOOD PRESSURE: 73 MMHG | HEART RATE: 65 BPM | OXYGEN SATURATION: 97 % | SYSTOLIC BLOOD PRESSURE: 114 MMHG

## 2023-01-27 DIAGNOSIS — R10.2 PELVIC AND PERINEAL PAIN: ICD-10-CM

## 2023-01-27 DIAGNOSIS — N94.9 UNSPECIFIED CONDITION ASSOCIATED WITH FEMALE GENITAL ORGANS AND MENSTRUAL CYCLE: ICD-10-CM

## 2023-01-27 LAB — CORE LAB BIOPSY: NORMAL

## 2023-01-27 PROCEDURE — 99213 OFFICE O/P EST LOW 20 MIN: CPT

## 2023-01-30 ENCOUNTER — RX RENEWAL (OUTPATIENT)
Age: 73
End: 2023-01-30

## 2023-01-30 ENCOUNTER — OFFICE (OUTPATIENT)
Dept: URBAN - METROPOLITAN AREA CLINIC 94 | Facility: CLINIC | Age: 73
Setting detail: OPHTHALMOLOGY
End: 2023-01-30
Payer: MEDICARE

## 2023-01-30 DIAGNOSIS — H43.813: ICD-10-CM

## 2023-01-30 DIAGNOSIS — H35.033: ICD-10-CM

## 2023-01-30 DIAGNOSIS — H35.071: ICD-10-CM

## 2023-01-30 DIAGNOSIS — H35.3131: ICD-10-CM

## 2023-01-30 DIAGNOSIS — H35.073: ICD-10-CM

## 2023-01-30 PROCEDURE — 92014 COMPRE OPH EXAM EST PT 1/>: CPT | Performed by: OPHTHALMOLOGY

## 2023-01-30 PROCEDURE — 67028 INJECTION EYE DRUG: CPT | Performed by: OPHTHALMOLOGY

## 2023-01-30 PROCEDURE — 92134 CPTRZ OPH DX IMG PST SGM RTA: CPT | Performed by: OPHTHALMOLOGY

## 2023-01-30 ASSESSMENT — REFRACTION_CURRENTRX
OS_CYLINDER: -1.00
OD_OVR_VA: 20/
OS_CYLINDER: -0.75
OS_SPHERE: +3.25
OS_ADD: +1.25
OD_VPRISM_DIRECTION: PROGS
OD_SPHERE: +8.50
OS_AXIS: 167
OD_OVR_VA: 20/
OD_CYLINDER: -0.50
OS_CYLINDER: -1.25
OD_AXIS: 001
OD_CYLINDER: -2.25
OD_SPHERE: +8.50
OS_VPRISM_DIRECTION: PROGS
OS_VPRISM_DIRECTION: PROGS
OS_AXIS: 164
OD_AXIS: 031
OS_ADD: +1.50
OS_CYLINDER: -1.00
OD_CYLINDER: -0.50
OD_ADD: +0.75
OS_SPHERE: +9.00
OD_CYLINDER: -2.25
OS_OVR_VA: 20/
OD_VPRISM_DIRECTION: PROGS
OD_AXIS: 027
OS_AXIS: 061
OD_VPRISM_DIRECTION: PROGS
OD_SPHERE: +2.50
OS_SPHERE: +3.50
OS_VPRISM_DIRECTION: PROGS
OS_SPHERE: +8.50
OD_SPHERE: +2.50
OS_AXIS: 171
OD_AXIS: 179
OS_OVR_VA: 20/
OD_OVR_VA: 20/
OS_OVR_VA: 20/
OD_ADD: +1.50
OD_ADD: +1.25

## 2023-01-30 ASSESSMENT — LID POSITION - DERMATOCHALASIS
OD_DERMATOCHALASIS: RUL 1+ 2+
OS_DERMATOCHALASIS: LUL 1+ 2+

## 2023-01-30 ASSESSMENT — TONOMETRY
OS_IOP_MMHG: 13
OD_IOP_MMHG: 11

## 2023-01-30 ASSESSMENT — VISUAL ACUITY
OS_BCVA: 20/100
OD_BCVA: 20/40-1

## 2023-01-30 ASSESSMENT — PACHYMETRY
OS_CT_UM: 552
OS_CT_CORRECTION: -1

## 2023-01-30 ASSESSMENT — SPHEQUIV_DERIVED
OS_SPHEQUIV: 3.75
OD_SPHEQUIV: 3.25

## 2023-01-30 ASSESSMENT — REFRACTION_AUTOREFRACTION
OD_SPHERE: +3.75
OS_SPHERE: +4.25
OD_CYLINDER: -1.00
OS_AXIS: 173
OD_AXIS: 008
OS_CYLINDER: -1.00

## 2023-01-30 ASSESSMENT — CONFRONTATIONAL VISUAL FIELD TEST (CVF)
OD_FINDINGS: FULL
OS_FINDINGS: FULL

## 2023-01-31 ENCOUNTER — OFFICE (OUTPATIENT)
Dept: URBAN - METROPOLITAN AREA CLINIC 115 | Facility: CLINIC | Age: 73
Setting detail: OPHTHALMOLOGY
End: 2023-01-31
Payer: MEDICARE

## 2023-01-31 DIAGNOSIS — H11.31: ICD-10-CM

## 2023-01-31 PROBLEM — R10.2 PELVIC PAIN: Status: ACTIVE | Noted: 2022-09-06

## 2023-01-31 PROBLEM — H35.071 RETINAL TELANGIECTASIA; RIGHT EYE, LEFT EYE, BOTH EYES: Status: ACTIVE | Noted: 2023-01-31

## 2023-01-31 PROBLEM — H35.072 RETINAL TELANGIECTASIA; RIGHT EYE, LEFT EYE, BOTH EYES: Status: ACTIVE | Noted: 2023-01-31

## 2023-01-31 PROBLEM — H35.073 RETINAL TELANGIECTASIA; RIGHT EYE, LEFT EYE, BOTH EYES: Status: ACTIVE | Noted: 2023-01-31

## 2023-01-31 PROBLEM — N94.9 VAGINAL BURNING: Status: ACTIVE | Noted: 2022-11-16

## 2023-01-31 PROCEDURE — 92012 INTRM OPH EXAM EST PATIENT: CPT | Performed by: OPHTHALMOLOGY

## 2023-01-31 ASSESSMENT — REFRACTION_CURRENTRX
OS_SPHERE: +3.50
OD_OVR_VA: 20/
OS_CYLINDER: -1.00
OD_AXIS: 027
OS_VPRISM_DIRECTION: PROGS
OD_VPRISM_DIRECTION: PROGS
OS_CYLINDER: -1.00
OS_OVR_VA: 20/
OS_SPHERE: +3.25
OS_OVR_VA: 20/
OD_ADD: +0.75
OS_AXIS: 171
OD_CYLINDER: -0.50
OS_CYLINDER: -1.25
OS_ADD: +1.50
OS_OVR_VA: 20/
OD_SPHERE: +2.50
OD_VPRISM_DIRECTION: PROGS
OS_SPHERE: +9.00
OS_ADD: +1.25
OD_CYLINDER: -2.25
OD_CYLINDER: -2.25
OD_CYLINDER: -0.50
OD_AXIS: 179
OD_ADD: +1.25
OD_SPHERE: +8.50
OD_AXIS: 001
OD_AXIS: 031
OD_SPHERE: +8.50
OD_OVR_VA: 20/
OD_ADD: +1.50
OD_VPRISM_DIRECTION: PROGS
OD_SPHERE: +2.50
OS_AXIS: 061
OS_SPHERE: +8.50
OS_AXIS: 167
OD_OVR_VA: 20/
OS_AXIS: 164
OS_VPRISM_DIRECTION: PROGS
OS_VPRISM_DIRECTION: PROGS
OS_CYLINDER: -0.75

## 2023-01-31 ASSESSMENT — REFRACTION_AUTOREFRACTION
OD_CYLINDER: -1.00
OD_AXIS: 008
OS_CYLINDER: -1.00
OS_SPHERE: +4.25
OD_SPHERE: +3.75
OS_AXIS: 173

## 2023-01-31 ASSESSMENT — PACHYMETRY
OS_CT_UM: 552
OS_CT_CORRECTION: -1

## 2023-01-31 ASSESSMENT — CONFRONTATIONAL VISUAL FIELD TEST (CVF)
OS_FINDINGS: FULL
OD_FINDINGS: FULL

## 2023-01-31 ASSESSMENT — SPHEQUIV_DERIVED
OS_SPHEQUIV: 3.75
OD_SPHEQUIV: 3.25

## 2023-01-31 ASSESSMENT — LID POSITION - DERMATOCHALASIS
OS_DERMATOCHALASIS: LUL 1+ 2+
OD_DERMATOCHALASIS: RUL 1+ 2+

## 2023-01-31 NOTE — HISTORY OF PRESENT ILLNESS
[FreeTextEntry1] : Vane is here for a f/u visit. \par \par SHe has felt good after her vulvar bx. \par \par She has been using topical estrace ftp to introitus qhs with good results. She denies all vulvar burning, itching, irritation. \par \par She is using V5 suppositories PV qhs with excellent results. \par She is going to PFPT with Jaqui in [place] with good results.
[FreeTextEntry1] : CPE

## 2023-01-31 NOTE — PHYSICAL EXAM
[No Acute Distress] : in no acute distress [Well developed] : well developed [Well Nourished] : ~L well nourished [Good Hygeine] : demonstrates good hygeine [Oriented x3] : oriented to person, place, and time [Normal Memory] : ~T memory was ~L unimpaired [Normal Mood/Affect] : mood and affect are normal [No Edema] : ~T edema was not present [Warm and Dry] : was warm and dry to touch [Normal Gait] : gait was normal [No Lesions] : no lesions were seen on the external genitalia [Vulvar Atrophy] : vulvar atrophy [Normal] : was normal [Atrophy] : atrophy [No Bleeding] : there was no active vaginal bleeding [Exam Deferred] : was deferred [de-identified] : Q-tip touch test 2/10 @ 1,5,6,7,11. Derm changes c/s LS. No ulcerations, fissures, erosions.  [de-identified] : Loss of architecture b/l labia minora. 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 Hypopigmentation of labia minora, & perineum  [FreeTextEntry3] : Caruncle resolved

## 2023-01-31 NOTE — DISCUSSION/SUMMARY
[FreeTextEntry1] : Vulvar biopsy site well healed. No s/s infection.\par REv'd bx results. Written info on vLS given to Zander.\par \par Clobetasol 0.05% ointment pea-size amt to areas as directed after a bath or shower QD x 3 weeks, then QOD x 3 weeks then 2x/week. Amount and location of cream application was demonstrated to patient today in office via mirror demonstration. \par \par Resume V5 suppositories PV whs.\par REsume topical estrace ftp to introitus qhs\par \par Continue PFPT with Jaqui.\par COntinue HEP, IM/MFR, stretching\par \par RTO 2 months

## 2023-02-08 ENCOUNTER — APPOINTMENT (OUTPATIENT)
Dept: UROGYNECOLOGY | Facility: CLINIC | Age: 73
End: 2023-02-08

## 2023-02-14 ENCOUNTER — APPOINTMENT (OUTPATIENT)
Dept: NEUROLOGY | Facility: CLINIC | Age: 73
End: 2023-02-14
Payer: MEDICARE

## 2023-02-14 PROCEDURE — 99215 OFFICE O/P EST HI 40 MIN: CPT | Mod: 95

## 2023-02-15 ENCOUNTER — RX ONLY (RX ONLY)
Age: 73
End: 2023-02-15

## 2023-02-15 ENCOUNTER — OFFICE (OUTPATIENT)
Dept: URBAN - METROPOLITAN AREA CLINIC 6 | Facility: CLINIC | Age: 73
Setting detail: OPHTHALMOLOGY
End: 2023-02-15
Payer: MEDICARE

## 2023-02-15 DIAGNOSIS — H35.071: ICD-10-CM

## 2023-02-15 DIAGNOSIS — H52.4: ICD-10-CM

## 2023-02-15 DIAGNOSIS — H35.073: ICD-10-CM

## 2023-02-15 DIAGNOSIS — H35.3131: ICD-10-CM

## 2023-02-15 DIAGNOSIS — H35.072: ICD-10-CM

## 2023-02-15 PROBLEM — H16.223 DRY EYE SYNDROME K SICCA; ,, BOTH EYES: Status: ACTIVE | Noted: 2023-02-15

## 2023-02-15 PROCEDURE — 99213 OFFICE O/P EST LOW 20 MIN: CPT | Performed by: OPHTHALMOLOGY

## 2023-02-15 PROCEDURE — 92015 DETERMINE REFRACTIVE STATE: CPT | Performed by: OPHTHALMOLOGY

## 2023-02-15 ASSESSMENT — REFRACTION_MANIFEST
OU_VA: 20/30+3
OD_SPHERE: +2.25
OS_SPHERE: +4.25
OD_CYLINDER: -1.25
OD_ADD: +2.75
OS_AXIS: 175
OD_VA1: 20/60-1
OU_VA: 20/30+3
OS_VA1: 20/25-2
OD_VA1: 20/60-1
OS_CYLINDER: -2.25
OD_AXIS: 020
OS_CYLINDER: -2.25
OS_ADD: +2.75
OS_SPHERE: +4.25
OS_AXIS: 175
OS_VA1: 20/25-2
OS_ADD: +2.75
OD_CYLINDER: -1.25
OD_ADD: +2.75
OD_AXIS: 020
OD_SPHERE: +2.25

## 2023-02-15 ASSESSMENT — SPHEQUIV_DERIVED
OS_SPHEQUIV: 3.125
OD_SPHEQUIV: 2.625
OS_SPHEQUIV: 3.25
OD_SPHEQUIV: 1.625
OS_SPHEQUIV: 3.125
OD_SPHEQUIV: 1.625

## 2023-02-15 ASSESSMENT — PACHYMETRY
OS_CT_CORRECTION: -1
OS_CT_UM: 552

## 2023-02-15 ASSESSMENT — VISUAL ACUITY
OS_BCVA: 20/70-2
OD_BCVA: 20/30

## 2023-02-15 ASSESSMENT — KERATOMETRY
OD_K2POWER_DIOPTERS: 44.50
OS_K1POWER_DIOPTERS: 42.75
OD_AXISANGLE_DEGREES: 085
OS_K2POWER_DIOPTERS: 45.00
OD_K1POWER_DIOPTERS: 41.50
METHOD_AUTO_MANUAL: AUTO
OS_AXISANGLE_DEGREES: 075

## 2023-02-15 ASSESSMENT — REFRACTION_CURRENTRX
OD_OVR_VA: 20/
OD_ADD: +1.25
OS_SPHERE: +3.50
OD_CYLINDER: -2.25
OS_AXIS: 164
OD_AXIS: 031
OS_CYLINDER: -1.00
OD_VPRISM_DIRECTION: PROGS
OS_AXIS: 061
OS_SPHERE: +3.25
OS_SPHERE: +9.00
OD_SPHERE: +2.50
OD_CYLINDER: -0.50
OD_VPRISM_DIRECTION: PROGS
OD_OVR_VA: 20/
OS_OVR_VA: 20/
OD_SPHERE: +2.50
OS_ADD: +2.50
OS_SPHERE: +3.75
OS_VPRISM_DIRECTION: PROGS
OS_CYLINDER: -2.00
OD_AXIS: 020
OD_VPRISM_DIRECTION: PROGS
OD_CYLINDER: -1.25
OD_AXIS: 179
OD_CYLINDER: -0.50
OS_OVR_VA: 20/
OD_SPHERE: +8.50
OS_AXIS: 165
OS_CYLINDER: -0.75
OS_AXIS: 171
OS_VPRISM_DIRECTION: PROGS
OS_VPRISM_DIRECTION: PROGS
OD_SPHERE: +3.00
OS_OVR_VA: 20/
OD_AXIS: 027
OS_CYLINDER: -1.00
OS_ADD: +1.25
OD_ADD: +2.50
OD_OVR_VA: 20/
OD_ADD: +0.75

## 2023-02-15 ASSESSMENT — AXIALLENGTH_DERIVED
OS_AL: 22.3103
OD_AL: 23.1509
OD_AL: 22.7817
OD_AL: 23.1509
OS_AL: 22.3103
OS_AL: 22.2668

## 2023-02-15 ASSESSMENT — REFRACTION_AUTOREFRACTION
OD_CYLINDER: -0.75
OD_AXIS: 020
OS_SPHERE: +4.25
OS_AXIS: 175
OS_CYLINDER: -2.00
OD_SPHERE: +3.00

## 2023-02-15 ASSESSMENT — CONFRONTATIONAL VISUAL FIELD TEST (CVF)
OS_FINDINGS: FULL
OD_FINDINGS: FULL

## 2023-02-15 ASSESSMENT — SUPERFICIAL PUNCTATE KERATITIS (SPK)
OS_SPK: T 1+
OD_SPK: T 1+

## 2023-02-15 ASSESSMENT — LID POSITION - DERMATOCHALASIS
OS_DERMATOCHALASIS: LUL 1+ 2+
OD_DERMATOCHALASIS: RUL 1+ 2+

## 2023-02-15 ASSESSMENT — TONOMETRY
OD_IOP_MMHG: 16
OS_IOP_MMHG: 15

## 2023-02-16 ENCOUNTER — NON-APPOINTMENT (OUTPATIENT)
Age: 73
End: 2023-02-16

## 2023-02-16 ENCOUNTER — APPOINTMENT (OUTPATIENT)
Dept: NEUROLOGY | Facility: CLINIC | Age: 73
End: 2023-02-16

## 2023-02-22 LAB
A VAGINAE DNA VAG QL NAA+PROBE: NORMAL
APPEARANCE: ABNORMAL
BACTERIA UR CULT: NORMAL
BACTERIA: NEGATIVE
BILIRUBIN URINE: ABNORMAL
BLOOD URINE: ABNORMAL
BVAB2 DNA VAG QL NAA+PROBE: NORMAL
C KRUSEI DNA VAG QL NAA+PROBE: NEGATIVE
C TRACH RRNA SPEC QL NAA+PROBE: NEGATIVE
CALCIUM OXALATE CRYSTALS: ABNORMAL
COLOR: ABNORMAL
GLUCOSE QUALITATIVE U: ABNORMAL
HYALINE CASTS: 0 /LPF
KETONES URINE: ABNORMAL
LEUKOCYTE ESTERASE URINE: NEGATIVE
MEGA1 DNA VAG QL NAA+PROBE: NORMAL
MICROSCOPIC-UA: NORMAL
N GONORRHOEA RRNA SPEC QL NAA+PROBE: NEGATIVE
NITRITE URINE: ABNORMAL
PH URINE: ABNORMAL
PROTEIN URINE: ABNORMAL
RED BLOOD CELLS URINE: 0 /HPF
SPECIFIC GRAVITY URINE: ABNORMAL
SQUAMOUS EPITHELIAL CELLS: 0 /HPF
T VAGINALIS RRNA SPEC QL NAA+PROBE: NEGATIVE
URINE COMMENTS: NORMAL
UROBILINOGEN URINE: ABNORMAL
WHITE BLOOD CELLS URINE: 0 /HPF

## 2023-03-08 ENCOUNTER — RX RENEWAL (OUTPATIENT)
Age: 73
End: 2023-03-08

## 2023-03-27 ENCOUNTER — RX RENEWAL (OUTPATIENT)
Age: 73
End: 2023-03-27

## 2023-04-25 ENCOUNTER — NON-APPOINTMENT (OUTPATIENT)
Age: 73
End: 2023-04-25

## 2023-04-27 ENCOUNTER — NON-APPOINTMENT (OUTPATIENT)
Age: 73
End: 2023-04-27

## 2023-05-02 LAB — BACTERIA UR CULT: NORMAL

## 2023-05-09 LAB
APPEARANCE: CLEAR
BACTERIA UR CULT: NORMAL
BACTERIA: NEGATIVE /HPF
BILIRUBIN URINE: NEGATIVE
BLOOD URINE: NEGATIVE
CAST: 0 /LPF
COLOR: NORMAL
EPITHELIAL CELLS: 5 /HPF
GLUCOSE QUALITATIVE U: NEGATIVE MG/DL
KETONES URINE: NEGATIVE MG/DL
LEUKOCYTE ESTERASE URINE: NEGATIVE
MICROSCOPIC-UA: NORMAL
NITRITE URINE: NEGATIVE
PH URINE: 7
PROTEIN URINE: NEGATIVE MG/DL
RED BLOOD CELLS URINE: 1 /HPF
SPECIFIC GRAVITY URINE: 1.02
UROBILINOGEN URINE: 0.2 MG/DL
WHITE BLOOD CELLS URINE: 0 /HPF

## 2023-05-25 ENCOUNTER — APPOINTMENT (OUTPATIENT)
Dept: NEUROLOGY | Facility: CLINIC | Age: 73
End: 2023-05-25

## 2023-06-01 ENCOUNTER — APPOINTMENT (OUTPATIENT)
Dept: NEUROLOGY | Facility: CLINIC | Age: 73
End: 2023-06-01
Payer: MEDICARE

## 2023-06-01 VITALS — SYSTOLIC BLOOD PRESSURE: 109 MMHG | HEART RATE: 62 BPM | DIASTOLIC BLOOD PRESSURE: 68 MMHG

## 2023-06-01 VITALS
WEIGHT: 125 LBS | HEIGHT: 64 IN | BODY MASS INDEX: 21.34 KG/M2 | HEART RATE: 62 BPM | SYSTOLIC BLOOD PRESSURE: 125 MMHG | DIASTOLIC BLOOD PRESSURE: 74 MMHG

## 2023-06-01 PROCEDURE — 99215 OFFICE O/P EST HI 40 MIN: CPT

## 2023-07-05 ENCOUNTER — RX RENEWAL (OUTPATIENT)
Age: 73
End: 2023-07-05

## 2023-07-18 ENCOUNTER — RX RENEWAL (OUTPATIENT)
Age: 73
End: 2023-07-18

## 2023-07-19 ENCOUNTER — APPOINTMENT (OUTPATIENT)
Dept: INTERNAL MEDICINE | Facility: CLINIC | Age: 73
End: 2023-07-19
Payer: MEDICARE

## 2023-07-19 VITALS
WEIGHT: 128 LBS | HEART RATE: 67 BPM | OXYGEN SATURATION: 9 % | BODY MASS INDEX: 21.85 KG/M2 | HEIGHT: 64 IN | DIASTOLIC BLOOD PRESSURE: 80 MMHG | SYSTOLIC BLOOD PRESSURE: 122 MMHG

## 2023-07-19 DIAGNOSIS — R39.89 OTHER SYMPTOMS AND SIGNS INVOLVING THE GENITOURINARY SYSTEM: ICD-10-CM

## 2023-07-19 DIAGNOSIS — R10.9 UNSPECIFIED ABDOMINAL PAIN: ICD-10-CM

## 2023-07-19 PROCEDURE — 99204 OFFICE O/P NEW MOD 45 MIN: CPT

## 2023-07-19 NOTE — PHYSICAL EXAM
[General Appearance - In No Acute Distress] : in no acute distress [General Appearance - Alert] : alert [Sclera] : the sclera and conjunctiva were normal [PERRL With Normal Accommodation] : pupils were equal in size, round, reactive to light [Outer Ear] : the ears and nose were normal in appearance [Extraocular Movements] : extraocular movements were intact [Oropharynx] : the oropharynx was normal with no thrush [Neck Appearance] : the appearance of the neck was normal [Neck Cervical Mass (___cm)] : no neck mass was observed [Jugular Venous Distention Increased] : there was no jugular-venous distention [Thyroid Diffuse Enlargement] : the thyroid was not enlarged [Auscultation Breath Sounds / Voice Sounds] : lungs were clear to auscultation bilaterally [Heart Rate And Rhythm] : heart rate was normal and rhythm regular [Heart Sounds] : normal S1 and S2 [Heart Sounds Gallop] : no gallops [Murmurs] : no murmurs [Heart Sounds Pericardial Friction Rub] : no pericardial rub [Full Pulse] : the pedal pulses are present [Edema] : there was no peripheral edema [FreeTextEntry1] : RIGHT SIDED ABD PAIN TNEDERN TO TOUCJ NO REBOUND [Musculoskeletal - Swelling] : no joint swelling [Nail Clubbing] : no clubbing  or cyanosis of the fingernails [Motor Tone] : muscle strength and tone were normal [Skin Color & Pigmentation] : normal skin color and pigmentation [] : no rash [Deep Tendon Reflexes (DTR)] : deep tendon reflexes were 2+ and symmetric [Sensation] : the sensory exam was normal to light touch and pinprick [No Focal Deficits] : no focal deficits

## 2023-07-19 NOTE — HISTORY OF PRESENT ILLNESS
[FreeTextEntry1] : 72yFEMALE PMH OF PARKINSOSNS DISEASE FOLOWED IN [facility] WITH DR ALBERTO ALSO WITH [facility] OF [facility] GASTROENTEROLOGY   SHE REPORTS  INCREASED   SINCE JAN 2023   HOWEVER AS PER CHART IT APPEASR TO BE GOING ON FOR NEARLY 2 YEARS\par SHE DESCRIBES PULLING SENSATION  BURNING URGENCY  \par HERE FOR EVALUTION FROM ID STANDPOINT

## 2023-07-19 NOTE — ASSESSMENT
[FreeTextEntry1] : 72yFEMALE PMH OF PARKINSOSNS DISEASE FOLOWED IN [facility] WITH DR ALBERTO ALSO WITH [facility] OF [facility] GASTROENTEROLOGY   SHE REPORTS  INCREASED   SINCE JAN 2023   HOWEVER AS PER CHART IT APPEASR TO BE GOING ON FOR NEARLY 2 YEARS\par SHE DESCRIBES PULLING SENSATION  BURNING URGENCY  \par HERE FOR EVALUTION FROM ID STANDPOINT\par PT HAD RECENT URINE CX  BUT WAS ONLY OFF ABX FOR ONE DAY IT GREW < 10K NL UROGENITAL GLENYS\par WILL RPEAT URINE AGAIN NOW OFF ABX FOR SARA THAN 5 DAYS'\par PT HAS SEEN UROGYNECOLOGIST INTHE PAST WILL NEED TO CONTACT THEM\par WILL  FOLLOW UP\par PT WITH PAIN RIGHT SIDE OF ABD SHE RPORTS WOARSE THAN USUAL\par WILL SNED FOR CT SCAN OF ABD PELVIS R/O UNDERLYING PATHOLOGY\par SHE IS NON TOXIC BUT DOES HAVE TENDERNESS \par WILL FOLLOW UP

## 2023-07-23 LAB
APPEARANCE: CLEAR
BACTERIA UR CULT: NORMAL
BACTERIA: NEGATIVE /HPF
BILIRUBIN URINE: NEGATIVE
BLOOD URINE: NEGATIVE
CAST: 0 /LPF
COLOR: YELLOW
EPITHELIAL CELLS: 0 /HPF
GLUCOSE QUALITATIVE U: NEGATIVE MG/DL
KETONES URINE: NEGATIVE MG/DL
LEUKOCYTE ESTERASE URINE: ABNORMAL
MICROSCOPIC-UA: NORMAL
NITRITE URINE: NEGATIVE
PH URINE: 6
PROTEIN URINE: NEGATIVE MG/DL
RED BLOOD CELLS URINE: 0 /HPF
REVIEW: NORMAL
SPECIFIC GRAVITY URINE: 1.01
UROBILINOGEN URINE: 0.2 MG/DL
WHITE BLOOD CELLS URINE: 2 /HPF

## 2023-07-26 ENCOUNTER — APPOINTMENT (OUTPATIENT)
Dept: INTERNAL MEDICINE | Facility: CLINIC | Age: 73
End: 2023-07-26

## 2023-08-16 ENCOUNTER — APPOINTMENT (OUTPATIENT)
Dept: DERMATOLOGY | Facility: CLINIC | Age: 73
End: 2023-08-16
Payer: MEDICARE

## 2023-08-16 PROCEDURE — 99213 OFFICE O/P EST LOW 20 MIN: CPT | Mod: 25

## 2023-08-16 PROCEDURE — 11102 TANGNTL BX SKIN SINGLE LES: CPT

## 2023-08-25 ENCOUNTER — APPOINTMENT (OUTPATIENT)
Dept: UROGYNECOLOGY | Facility: CLINIC | Age: 73
End: 2023-08-25
Payer: MEDICARE

## 2023-08-25 VITALS
DIASTOLIC BLOOD PRESSURE: 66 MMHG | TEMPERATURE: 97.6 F | SYSTOLIC BLOOD PRESSURE: 109 MMHG | HEART RATE: 54 BPM | OXYGEN SATURATION: 100 %

## 2023-08-25 DIAGNOSIS — Z87.898 PERSONAL HISTORY OF OTHER SPECIFIED CONDITIONS: ICD-10-CM

## 2023-08-25 DIAGNOSIS — R35.0 FREQUENCY OF MICTURITION: ICD-10-CM

## 2023-08-25 PROCEDURE — 99215 OFFICE O/P EST HI 40 MIN: CPT | Mod: 25

## 2023-08-25 PROCEDURE — 51700 IRRIGATION OF BLADDER: CPT

## 2023-08-25 RX ORDER — ESTRADIOL 0.1 MG/G
0.1 CREAM VAGINAL
Qty: 1 | Refills: 2 | Status: ACTIVE | COMMUNITY
Start: 2022-09-06 | End: 1900-01-01

## 2023-08-25 NOTE — DISCUSSION/SUMMARY
[FreeTextEntry1] : Intravesical instillation therapy was reviewed in detail with the patient. All SE/AE, risks vs benefits, and alternative treatment options were discussed. I explained that the use if intravesical anesthetic instillations are off-label and not FDA approved for the use of IC/BPS. The patient verbalized an understanding and agreed to proceed with BI therapy at this time. She understands that she may have an exacerbation of symptoms initially and it may take a few treatments before she begins to see improvement.   An intravesical anesthetic instillation was instilled into the bladder.  The components of the instillation include: 20cc Lidocaine 2% gel  10cc Marcaine 0.5% 2cc Gentamicin 80mg/2ml  2cc Heparin 10,000U/ml 1cc Triamcinolone 40mg/ml  The urethra was prepped and draped with chlorhexadine and a 10Fr catheter was inserted in a sterile fashion; all urine was drained. The medication was then instilled into the bladder. The catheter was removed and the patient sat in the room for 30 minutes with the instillation. After 30 minutes, the patient urinated the contents of her bladder without difficulty. She reported 75% resolution of her bladder pain.  The patient tolerated the procedure well without complaints. She was given precautions on urinary retention and urinary tract infections. She verbalized understanding of all information given and will call the office with any questions or concerns.   UA and C&S sent today from straight cath culture. Pyridium 200mg 1 tab po qd with dinner. May take additional tab during the day if needed for BPS symptoms. Take with food, increase water intake.   Resume V5 suppositories PV hs 3x/week. Continue PFPT with Jody. Continue warm baths daily.  Continue topical estradiol ftp to introitus qhs Clobetasol 0.05% ointment pea-size amt after bath/shower to areas of vLS 2x/week.  Amount and location of cream application was demonstrated to patient today in office via mirror demonstration.  RTO weekly BLIs RTO 3 months

## 2023-08-25 NOTE — HISTORY OF PRESENT ILLNESS
[FreeTextEntry1] : Vane is here for a f/u visit.  She has been using topical estrace ftp to introitus qhs with good results.  She states she feels dry.  She denies all vulvar burning, itching, irritation at the moment.  She is using clobetasol QD. She never decreased dose as per titration schedule.   She is using V5 suppositories PV qhs with excellent results.  She is going to PFPT with Jody in [place] q2 weeks with good results.  She endorses lower abdominal pressure. She is not sure if it's her PFD, colitis or constipation. She has an appt with GI on 9/6. She endorses urinary frequency q hour, mid urgency, some burning after urination, nocturia x 4-5, pain with bladder filling and some relief with emptying. No hematuria, CVAT, fever, chills. All urine cultures have proven negative.  She recently saw ID (Dr. Muller) who ordered CT scan of abdomen and pelvis.

## 2023-08-25 NOTE — PHYSICAL EXAM
[No Acute Distress] : in no acute distress [Well developed] : well developed [Well Nourished] : ~L well nourished [Good Hygeine] : demonstrates good hygeine [Oriented x3] : oriented to person, place, and time [Normal Memory] : ~T memory was ~L unimpaired [Normal Mood/Affect] : mood and affect are normal [No Edema] : ~T edema was not present [Warm and Dry] : was warm and dry to touch [Normal Gait] : gait was normal [No Lesions] : no lesions were seen on the external genitalia [Vulvar Atrophy] : vulvar atrophy [Normal] : was normal [Atrophy] : atrophy [No Bleeding] : there was no active vaginal bleeding [Exam Deferred] : was deferred [Normal Appearance] : general appearance was normal [Tenderness] : tenderness [de-identified] : Loss of architecture b/l labia minora. 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 Hypopigmentation of labia minora, & perineum  [de-identified] : Q-tip touch test 2/10 @ 1,5,6,7,11. Derm changes c/s LS. No ulcerations, fissures, erosions.  [FreeTextEntry4] : PFMs 2/4 with taut bands and tenderness to palpation of b/l PC/IC/C [FreeTextEntry3] : Caruncle resolved [de-identified] : posterior bladder wall exquisitely painful to palpation

## 2023-08-29 LAB
APPEARANCE: CLEAR
BACTERIA UR CULT: NORMAL
BACTERIA: NEGATIVE /HPF
BILIRUBIN URINE: NEGATIVE
BLOOD URINE: NEGATIVE
CAST: 0 /LPF
COLOR: NORMAL
EPITHELIAL CELLS: 0 /HPF
GLUCOSE QUALITATIVE U: NEGATIVE MG/DL
KETONES URINE: NEGATIVE MG/DL
LEUKOCYTE ESTERASE URINE: NEGATIVE
MICROSCOPIC-UA: NORMAL
NITRITE URINE: NEGATIVE
PH URINE: 6
PROTEIN URINE: NEGATIVE MG/DL
RED BLOOD CELLS URINE: 0 /HPF
SPECIFIC GRAVITY URINE: 1.02
UROBILINOGEN URINE: 0.2 MG/DL
WHITE BLOOD CELLS URINE: 0 /HPF

## 2023-09-01 ENCOUNTER — APPOINTMENT (OUTPATIENT)
Dept: UROGYNECOLOGY | Facility: CLINIC | Age: 73
End: 2023-09-01
Payer: MEDICARE

## 2023-09-01 VITALS
HEART RATE: 71 BPM | RESPIRATION RATE: 16 BRPM | OXYGEN SATURATION: 97 % | DIASTOLIC BLOOD PRESSURE: 71 MMHG | SYSTOLIC BLOOD PRESSURE: 107 MMHG

## 2023-09-01 PROCEDURE — 51700 IRRIGATION OF BLADDER: CPT

## 2023-09-05 LAB
APPEARANCE: CLEAR
BACTERIA UR CULT: NORMAL
BACTERIA: NEGATIVE /HPF
BILIRUBIN URINE: NEGATIVE
BLOOD URINE: NEGATIVE
CAST: 0 /LPF
COLOR: YELLOW
EPITHELIAL CELLS: 1 /HPF
GLUCOSE QUALITATIVE U: NEGATIVE MG/DL
KETONES URINE: NEGATIVE MG/DL
LEUKOCYTE ESTERASE URINE: ABNORMAL
MICROSCOPIC-UA: NORMAL
NITRITE URINE: NEGATIVE
PH URINE: 6.5
PROTEIN URINE: NEGATIVE MG/DL
RED BLOOD CELLS URINE: 0 /HPF
SPECIFIC GRAVITY URINE: <1.005
UROBILINOGEN URINE: 0.2 MG/DL
WHITE BLOOD CELLS URINE: 0 /HPF

## 2023-09-08 ENCOUNTER — APPOINTMENT (OUTPATIENT)
Dept: UROGYNECOLOGY | Facility: CLINIC | Age: 73
End: 2023-09-08
Payer: MEDICARE

## 2023-09-08 VITALS — SYSTOLIC BLOOD PRESSURE: 119 MMHG | DIASTOLIC BLOOD PRESSURE: 78 MMHG | HEART RATE: 63 BPM

## 2023-09-08 PROCEDURE — 51700 IRRIGATION OF BLADDER: CPT

## 2023-09-15 ENCOUNTER — APPOINTMENT (OUTPATIENT)
Dept: UROGYNECOLOGY | Facility: CLINIC | Age: 73
End: 2023-09-15
Payer: MEDICARE

## 2023-09-15 VITALS
DIASTOLIC BLOOD PRESSURE: 68 MMHG | SYSTOLIC BLOOD PRESSURE: 106 MMHG | WEIGHT: 125 LBS | HEIGHT: 63 IN | BODY MASS INDEX: 22.15 KG/M2

## 2023-09-15 PROCEDURE — 51700 IRRIGATION OF BLADDER: CPT

## 2023-09-20 ENCOUNTER — APPOINTMENT (OUTPATIENT)
Dept: UROGYNECOLOGY | Facility: CLINIC | Age: 73
End: 2023-09-20
Payer: MEDICARE

## 2023-09-20 VITALS
SYSTOLIC BLOOD PRESSURE: 96 MMHG | BODY MASS INDEX: 22.15 KG/M2 | WEIGHT: 125 LBS | HEIGHT: 63 IN | DIASTOLIC BLOOD PRESSURE: 60 MMHG

## 2023-09-20 PROCEDURE — 51700 IRRIGATION OF BLADDER: CPT

## 2023-09-25 ENCOUNTER — NON-APPOINTMENT (OUTPATIENT)
Age: 73
End: 2023-09-25

## 2023-09-29 ENCOUNTER — APPOINTMENT (OUTPATIENT)
Dept: UROGYNECOLOGY | Facility: CLINIC | Age: 73
End: 2023-09-29
Payer: MEDICARE

## 2023-09-29 VITALS
WEIGHT: 125 LBS | DIASTOLIC BLOOD PRESSURE: 70 MMHG | SYSTOLIC BLOOD PRESSURE: 102 MMHG | HEIGHT: 63 IN | BODY MASS INDEX: 22.15 KG/M2

## 2023-09-29 PROCEDURE — 51700 IRRIGATION OF BLADDER: CPT

## 2023-10-06 ENCOUNTER — APPOINTMENT (OUTPATIENT)
Dept: UROGYNECOLOGY | Facility: CLINIC | Age: 73
End: 2023-10-06
Payer: MEDICARE

## 2023-10-06 ENCOUNTER — APPOINTMENT (OUTPATIENT)
Dept: DERMATOLOGY | Facility: CLINIC | Age: 73
End: 2023-10-06
Payer: MEDICARE

## 2023-10-06 PROCEDURE — 99212 OFFICE O/P EST SF 10 MIN: CPT | Mod: 25

## 2023-10-06 PROCEDURE — 51700 IRRIGATION OF BLADDER: CPT

## 2023-10-06 PROCEDURE — 17261 DSTRJ MAL LES T/A/L .6-1.0CM: CPT

## 2023-10-10 LAB
APPEARANCE: CLEAR
BACTERIA: NEGATIVE /HPF
BILIRUBIN URINE: NEGATIVE
BLOOD URINE: NEGATIVE
CAST: 0 /LPF
COLOR: NORMAL
EPITHELIAL CELLS: 1 /HPF
GLUCOSE QUALITATIVE U: NEGATIVE MG/DL
KETONES URINE: ABNORMAL MG/DL
LEUKOCYTE ESTERASE URINE: ABNORMAL
MICROSCOPIC-UA: NORMAL
NITRITE URINE: NEGATIVE
PH URINE: 6.5
PROTEIN URINE: NEGATIVE MG/DL
RED BLOOD CELLS URINE: 1 /HPF
SPECIFIC GRAVITY URINE: 1.03
UROBILINOGEN URINE: 1 MG/DL
WHITE BLOOD CELLS URINE: 0 /HPF

## 2023-10-11 LAB — BACTERIA UR CULT: NORMAL

## 2023-10-13 ENCOUNTER — APPOINTMENT (OUTPATIENT)
Dept: UROGYNECOLOGY | Facility: CLINIC | Age: 73
End: 2023-10-13
Payer: MEDICARE

## 2023-10-13 VITALS — DIASTOLIC BLOOD PRESSURE: 64 MMHG | SYSTOLIC BLOOD PRESSURE: 114 MMHG

## 2023-10-13 DIAGNOSIS — R39.89 OTHER SYMPTOMS AND SIGNS INVOLVING THE GENITOURINARY SYSTEM: ICD-10-CM

## 2023-10-13 PROCEDURE — 51700 IRRIGATION OF BLADDER: CPT

## 2023-10-16 ENCOUNTER — RX RENEWAL (OUTPATIENT)
Age: 73
End: 2023-10-16

## 2023-10-20 ENCOUNTER — APPOINTMENT (OUTPATIENT)
Dept: UROGYNECOLOGY | Facility: CLINIC | Age: 73
End: 2023-10-20
Payer: MEDICARE

## 2023-10-20 VITALS
DIASTOLIC BLOOD PRESSURE: 76 MMHG | OXYGEN SATURATION: 97 % | SYSTOLIC BLOOD PRESSURE: 112 MMHG | RESPIRATION RATE: 16 BRPM | HEART RATE: 58 BPM

## 2023-10-20 PROCEDURE — 51700 IRRIGATION OF BLADDER: CPT

## 2023-10-27 ENCOUNTER — APPOINTMENT (OUTPATIENT)
Dept: UROGYNECOLOGY | Facility: CLINIC | Age: 73
End: 2023-10-27
Payer: MEDICARE

## 2023-10-27 PROCEDURE — 51700 IRRIGATION OF BLADDER: CPT

## 2023-10-31 LAB
APPEARANCE: CLEAR
BACTERIA UR CULT: NORMAL
BACTERIA: NEGATIVE /HPF
BILIRUBIN URINE: NEGATIVE
BLOOD URINE: NEGATIVE
CAST: 1 /LPF
COLOR: YELLOW
EPITHELIAL CELLS: 1 /HPF
GLUCOSE QUALITATIVE U: NEGATIVE MG/DL
KETONES URINE: NEGATIVE MG/DL
LEUKOCYTE ESTERASE URINE: NEGATIVE
MICROSCOPIC-UA: NORMAL
NITRITE URINE: NEGATIVE
PH URINE: 5.5
PROTEIN URINE: NEGATIVE MG/DL
RED BLOOD CELLS URINE: 0 /HPF
REVIEW: NORMAL
SPECIFIC GRAVITY URINE: 1.02
UROBILINOGEN URINE: 0.2 MG/DL
WHITE BLOOD CELLS URINE: 0 /HPF

## 2023-11-01 ENCOUNTER — APPOINTMENT (OUTPATIENT)
Dept: UROGYNECOLOGY | Facility: CLINIC | Age: 73
End: 2023-11-01
Payer: MEDICARE

## 2023-11-01 VITALS — SYSTOLIC BLOOD PRESSURE: 104 MMHG | DIASTOLIC BLOOD PRESSURE: 70 MMHG | HEART RATE: 69 BPM | TEMPERATURE: 97.4 F

## 2023-11-01 DIAGNOSIS — R35.1 NOCTURIA: ICD-10-CM

## 2023-11-01 PROCEDURE — 51700 IRRIGATION OF BLADDER: CPT

## 2023-11-01 PROCEDURE — 99214 OFFICE O/P EST MOD 30 MIN: CPT | Mod: 25

## 2023-11-02 RX ORDER — POLYETHYLENE GLYCOL 3350 17 G/17G
17 POWDER, FOR SOLUTION ORAL DAILY
Qty: 1 | Refills: 11 | Status: ACTIVE | COMMUNITY
Start: 2023-11-02 | End: 1900-01-01

## 2023-11-03 ENCOUNTER — APPOINTMENT (OUTPATIENT)
Dept: UROGYNECOLOGY | Facility: CLINIC | Age: 73
End: 2023-11-03

## 2023-11-09 ENCOUNTER — APPOINTMENT (OUTPATIENT)
Dept: NEUROLOGY | Facility: CLINIC | Age: 73
End: 2023-11-09
Payer: MEDICARE

## 2023-11-09 VITALS
HEART RATE: 80 BPM | DIASTOLIC BLOOD PRESSURE: 71 MMHG | WEIGHT: 133 LBS | BODY MASS INDEX: 23.57 KG/M2 | SYSTOLIC BLOOD PRESSURE: 111 MMHG | HEIGHT: 63 IN

## 2023-11-09 PROCEDURE — 99215 OFFICE O/P EST HI 40 MIN: CPT

## 2023-11-09 RX ORDER — CARBIDOPA, LEVODOPA AND ENTACAPONE 31.25; 125; 2 MG/1; MG/1; MG/1
31.25-125-2 TABLET, FILM COATED ORAL
Qty: 120 | Refills: 11 | Status: ACTIVE | COMMUNITY
Start: 2023-11-09 | End: 1900-01-01

## 2023-11-10 ENCOUNTER — APPOINTMENT (OUTPATIENT)
Dept: UROGYNECOLOGY | Facility: CLINIC | Age: 73
End: 2023-11-10
Payer: MEDICARE

## 2023-11-10 VITALS — DIASTOLIC BLOOD PRESSURE: 57 MMHG | SYSTOLIC BLOOD PRESSURE: 91 MMHG

## 2023-11-10 PROCEDURE — 51700 IRRIGATION OF BLADDER: CPT

## 2023-12-01 ENCOUNTER — APPOINTMENT (OUTPATIENT)
Dept: UROGYNECOLOGY | Facility: CLINIC | Age: 73
End: 2023-12-01
Payer: MEDICARE

## 2023-12-01 PROCEDURE — 51700 IRRIGATION OF BLADDER: CPT

## 2023-12-08 ENCOUNTER — APPOINTMENT (OUTPATIENT)
Dept: UROGYNECOLOGY | Facility: CLINIC | Age: 73
End: 2023-12-08
Payer: MEDICARE

## 2023-12-08 VITALS — DIASTOLIC BLOOD PRESSURE: 56 MMHG | SYSTOLIC BLOOD PRESSURE: 90 MMHG

## 2023-12-08 PROCEDURE — 51700 IRRIGATION OF BLADDER: CPT

## 2023-12-12 LAB
APPEARANCE: CLEAR
BACTERIA UR CULT: NORMAL
BACTERIA: NEGATIVE /HPF
BILIRUBIN URINE: NEGATIVE
BLOOD URINE: NEGATIVE
CANDIDA VAG CYTO: NOT DETECTED
CAST: 0 /LPF
COLOR: NORMAL
EPITHELIAL CELLS: 0 /HPF
G VAGINALIS+PREV SP MTYP VAG QL MICRO: NOT DETECTED
GLUCOSE QUALITATIVE U: NEGATIVE MG/DL
KETONES URINE: ABNORMAL MG/DL
LEUKOCYTE ESTERASE URINE: ABNORMAL
MICROSCOPIC-UA: NORMAL
NITRITE URINE: NEGATIVE
PH URINE: 5.5
PROTEIN URINE: NEGATIVE MG/DL
RED BLOOD CELLS URINE: 1 /HPF
SPECIFIC GRAVITY URINE: 1.02
T VAGINALIS VAG QL WET PREP: NOT DETECTED
UROBILINOGEN URINE: 0.2 MG/DL
WHITE BLOOD CELLS URINE: 0 /HPF

## 2023-12-13 ENCOUNTER — APPOINTMENT (OUTPATIENT)
Dept: UROGYNECOLOGY | Facility: CLINIC | Age: 73
End: 2023-12-13
Payer: MEDICARE

## 2023-12-13 VITALS — HEART RATE: 67 BPM | DIASTOLIC BLOOD PRESSURE: 62 MMHG | SYSTOLIC BLOOD PRESSURE: 97 MMHG

## 2023-12-13 PROCEDURE — 51700 IRRIGATION OF BLADDER: CPT

## 2023-12-14 ENCOUNTER — APPOINTMENT (OUTPATIENT)
Dept: NEUROLOGY | Facility: CLINIC | Age: 73
End: 2023-12-14

## 2023-12-22 ENCOUNTER — APPOINTMENT (OUTPATIENT)
Dept: UROGYNECOLOGY | Facility: CLINIC | Age: 73
End: 2023-12-22

## 2024-01-02 ENCOUNTER — NON-APPOINTMENT (OUTPATIENT)
Age: 74
End: 2024-01-02

## 2024-01-05 ENCOUNTER — APPOINTMENT (OUTPATIENT)
Dept: UROGYNECOLOGY | Facility: CLINIC | Age: 74
End: 2024-01-05

## 2024-01-12 ENCOUNTER — APPOINTMENT (OUTPATIENT)
Dept: UROGYNECOLOGY | Facility: CLINIC | Age: 74
End: 2024-01-12
Payer: MEDICARE

## 2024-01-12 PROCEDURE — 51700 IRRIGATION OF BLADDER: CPT

## 2024-01-19 ENCOUNTER — APPOINTMENT (OUTPATIENT)
Dept: UROGYNECOLOGY | Facility: CLINIC | Age: 74
End: 2024-01-19

## 2024-01-26 ENCOUNTER — APPOINTMENT (OUTPATIENT)
Dept: UROGYNECOLOGY | Facility: CLINIC | Age: 74
End: 2024-01-26
Payer: MEDICARE

## 2024-01-26 VITALS — DIASTOLIC BLOOD PRESSURE: 63 MMHG | SYSTOLIC BLOOD PRESSURE: 94 MMHG

## 2024-01-26 DIAGNOSIS — N89.8 OTHER SPECIFIED NONINFLAMMATORY DISORDERS OF VAGINA: ICD-10-CM

## 2024-01-26 PROCEDURE — 99214 OFFICE O/P EST MOD 30 MIN: CPT | Mod: 25

## 2024-01-26 PROCEDURE — 51700 IRRIGATION OF BLADDER: CPT

## 2024-01-26 RX ORDER — FLUCONAZOLE 150 MG/1
150 TABLET ORAL
Qty: 2 | Refills: 0 | Status: ACTIVE | COMMUNITY
Start: 2024-01-26 | End: 1900-01-01

## 2024-01-26 RX ORDER — PHENAZOPYRIDINE HYDROCHLORIDE 200 MG/1
200 TABLET ORAL
Qty: 60 | Refills: 0 | Status: ACTIVE | COMMUNITY
Start: 2022-11-16 | End: 1900-01-01

## 2024-01-26 NOTE — HISTORY OF PRESENT ILLNESS
[FreeTextEntry1] : Vane is here for a f/u visit.  She reports improvement in her IC/BPS symptoms. She endorses urinary frequency q 2 hours, mild urgency, nocturia x 2. Since starting intravesical therapy, she endorses decreased pain with bladder filling. No hematuria, CVAT, fever, chills. All urine cultures have proven negative.  She is taking pyridium prn, approx 2x/week.  She reports that BLIs have significantly improved bladder pain/pressure.  She is able to hold the instillation for 2-3 hours and void without difficulty. She would like to extend time interval b/t BLIs to q 2 weeks.   She is using topical estrace ftp to introitus hs 2-3x/week. She denies all vulvar burning, itching, irritation at the moment.  She is using clobetasol to areas of vLS 2-3x/week.  She is using V5 suppositories PV approx 2x/week. She is finished PFPT with Steffany.  She is doing stretches, HEP at home. She is dilating weekly.  She is on abx for sinus infection. She is requesting fluconazole for VVC ppx.

## 2024-01-26 NOTE — PHYSICAL EXAM
[No Acute Distress] : in no acute distress [Well developed] : well developed [Well Nourished] : ~L well nourished [Good Hygeine] : demonstrates good hygeine [Oriented x3] : oriented to person, place, and time [Normal Memory] : ~T memory was ~L unimpaired [Normal Mood/Affect] : mood and affect are normal [Warm and Dry] : was warm and dry to touch [No Edema] : ~T edema was not present [Normal Gait] : gait was normal [No Lesions] : no lesions were seen on the external genitalia [Vulvar Atrophy] : vulvar atrophy [Normal] : was normal [Normal Appearance] : general appearance was normal [Atrophy] : atrophy [Tenderness] : tenderness [No Bleeding] : there was no active vaginal bleeding [Exam Deferred] : was deferred [de-identified] : Q-tip touch test 2/10 @ 5,6,7 with mild erythema posterior fourchette. Derm changes c/s LS. No ulcerations, fissures, erosions.  [de-identified] : Derm changes c/w vLS, improved compared to previous exam. [FreeTextEntry3] : Caruncle resolved [de-identified] : posterior bladder wall mildly tender to palpation [FreeTextEntry4] : PFMs 2/4, supple, non-tender to palpation

## 2024-01-26 NOTE — REASON FOR VISIT
IP Liaison - Final Visit Note    Patient: Nithin Wagner  MRN:  [id number]  Date of Service:  12/12/2022  Completed by:  AMY Malik    Reason for Visit   Patient presents with    IP Liaison Chart Review      Patient discharged to [facility].    Patient Summary     Discharge Date: 12/1/2022  Discharge telephone number/address: [phone] [place] [address]  Follow up provider: n/a  Follow up appointments: n/a  Home Health agency & telephone number: n/a  DME ordered &  name: n/a  Assigned OPCM RN/SW: n/a  Report sent to follow up team (PCP/OPCM) via in basket message: n/a  Community Resources arranged including agency name & contact info: none    AMY aMlik              
[Follow-Up Visit_____] : a follow-up visit for [unfilled]

## 2024-01-26 NOTE — PROCEDURE
[Patient] : the patient [Consent Obtained] : written consent was obtained prior to the procedure and is detailed in the patient's record [Other ___] : [unfilled] [No Complications] : none [No] : Specimen not sent to Pathology [Post procedure instructions and information given] : post procedure instructions and information given and reviewed with patient. [Tolerated Well] : the patient tolerated the procedure well [0] : 0 [FreeTextEntry1] : SEE NOTE BELOW

## 2024-01-26 NOTE — DISCUSSION/SUMMARY
[FreeTextEntry1] : An intravesical anesthetic instillation was instilled into the bladder.  The components of the instillation include: 10cc Lidocaine 2%  5cc Sodium Bicarb 8.4% 2cc Gentamicin 80mg/2ml  2cc Heparin 10,000U/ml 1cc Triamcinolone 40mg/ml  The urethra was prepped and draped with chlorhexadine and a 10Fr catheter was inserted in a sterile fashion; all urine was drained. The medication was then instilled into the bladder. The catheter was removed and the patient was instructed to void in 2 hours.  The patient tolerated the procedure well without complaints. She was given precautions on urinary retention and urinary tract infections. She verbalized understanding of all information given and will call the office with any questions or concerns.   May use Pyridium 200mg 1 tab po qd with dinner prn. May take additional tab during the day if needed for BPS symptoms. Take with food, increase water intake.   Continue V5 suppositories PV hs 2-3x/week. Continue warm baths daily. Continue dilation, stretching, HEP at home.  Continue topical estradiol ftp to introitus hs 2-3x/week. Continue Clobetasol 0.05% ointment pea-size amt after bath/shower to areas of vLS 2-3x/week.   Fluconazole 150mg 1 tab q3d x 2 doses for VVC ppx.  RTO q 2 weeks BLIs RTO 4 months

## 2024-02-01 ENCOUNTER — APPOINTMENT (OUTPATIENT)
Dept: NEUROLOGY | Facility: CLINIC | Age: 74
End: 2024-02-01
Payer: MEDICARE

## 2024-02-01 VITALS
BODY MASS INDEX: 22.68 KG/M2 | WEIGHT: 128 LBS | SYSTOLIC BLOOD PRESSURE: 114 MMHG | DIASTOLIC BLOOD PRESSURE: 68 MMHG | HEIGHT: 63 IN | HEART RATE: 67 BPM

## 2024-02-01 VITALS — HEART RATE: 73 BPM | SYSTOLIC BLOOD PRESSURE: 102 MMHG | DIASTOLIC BLOOD PRESSURE: 68 MMHG

## 2024-02-01 PROCEDURE — G2211 COMPLEX E/M VISIT ADD ON: CPT

## 2024-02-01 PROCEDURE — 99215 OFFICE O/P EST HI 40 MIN: CPT

## 2024-02-01 RX ORDER — MIDODRINE HYDROCHLORIDE 2.5 MG/1
2.5 TABLET ORAL
Qty: 60 | Refills: 11 | Status: ACTIVE | COMMUNITY
Start: 2024-02-01 | End: 1900-01-01

## 2024-02-01 RX ORDER — ALPRAZOLAM 0.5 MG/1
0.5 TABLET ORAL
Qty: 30 | Refills: 0 | Status: ACTIVE | COMMUNITY
Start: 2022-11-17 | End: 1900-01-01

## 2024-02-01 RX ORDER — CARBIDOPA AND LEVODOPA 25; 100 MG/1; MG/1
25-100 TABLET, EXTENDED RELEASE ORAL
Qty: 90 | Refills: 3 | Status: DISCONTINUED | COMMUNITY
Start: 2022-06-30 | End: 2024-02-01

## 2024-02-01 RX ORDER — CARBIDOPA AND LEVODOPA 25; 100 MG/1; MG/1
25-100 TABLET, EXTENDED RELEASE ORAL
Qty: 60 | Refills: 11 | Status: DISCONTINUED | COMMUNITY
Start: 2022-01-27 | End: 2024-02-01

## 2024-02-01 RX ORDER — MIDODRINE HYDROCHLORIDE 2.5 MG/1
2.5 TABLET ORAL
Qty: 30 | Refills: 0 | Status: DISCONTINUED | COMMUNITY
Start: 2019-08-08 | End: 2024-02-01

## 2024-02-01 RX ORDER — CARBIDOPA AND LEVODOPA 25; 100 MG/1; MG/1
25-100 TABLET ORAL
Qty: 270 | Refills: 3 | Status: DISCONTINUED | COMMUNITY
Start: 2023-03-27 | End: 2024-02-01

## 2024-02-09 ENCOUNTER — APPOINTMENT (OUTPATIENT)
Dept: UROGYNECOLOGY | Facility: CLINIC | Age: 74
End: 2024-02-09
Payer: MEDICARE

## 2024-02-09 VITALS — HEART RATE: 60 BPM | SYSTOLIC BLOOD PRESSURE: 95 MMHG | DIASTOLIC BLOOD PRESSURE: 57 MMHG

## 2024-02-09 PROCEDURE — 51700 IRRIGATION OF BLADDER: CPT

## 2024-02-14 LAB — BACTERIA UR CULT: NORMAL

## 2024-02-20 ENCOUNTER — APPOINTMENT (OUTPATIENT)
Dept: DERMATOLOGY | Facility: CLINIC | Age: 74
End: 2024-02-20
Payer: MEDICARE

## 2024-02-20 PROCEDURE — 11102 TANGNTL BX SKIN SINGLE LES: CPT

## 2024-02-20 PROCEDURE — 17000 DESTRUCT PREMALG LESION: CPT | Mod: 59

## 2024-02-20 PROCEDURE — 99213 OFFICE O/P EST LOW 20 MIN: CPT | Mod: 25

## 2024-02-23 ENCOUNTER — APPOINTMENT (OUTPATIENT)
Dept: UROGYNECOLOGY | Facility: CLINIC | Age: 74
End: 2024-02-23
Payer: MEDICARE

## 2024-02-23 VITALS
HEIGHT: 63 IN | DIASTOLIC BLOOD PRESSURE: 52 MMHG | OXYGEN SATURATION: 96 % | SYSTOLIC BLOOD PRESSURE: 86 MMHG | HEART RATE: 62 BPM | BODY MASS INDEX: 22.68 KG/M2 | WEIGHT: 128 LBS

## 2024-02-23 PROCEDURE — 51700 IRRIGATION OF BLADDER: CPT

## 2024-03-08 ENCOUNTER — APPOINTMENT (OUTPATIENT)
Dept: UROGYNECOLOGY | Facility: CLINIC | Age: 74
End: 2024-03-08
Payer: MEDICARE

## 2024-03-08 VITALS
SYSTOLIC BLOOD PRESSURE: 86 MMHG | DIASTOLIC BLOOD PRESSURE: 62 MMHG | WEIGHT: 128 LBS | HEART RATE: 61 BPM | HEIGHT: 63 IN | BODY MASS INDEX: 22.68 KG/M2 | OXYGEN SATURATION: 96 %

## 2024-03-08 PROCEDURE — 51700 IRRIGATION OF BLADDER: CPT

## 2024-03-11 ENCOUNTER — NON-APPOINTMENT (OUTPATIENT)
Age: 74
End: 2024-03-11

## 2024-03-11 LAB
APPEARANCE: CLEAR
BACTERIA: NEGATIVE /HPF
BILIRUBIN URINE: NEGATIVE
BLOOD URINE: NEGATIVE
CAST: 0 /LPF
COLOR: NORMAL
EPITHELIAL CELLS: 0 /HPF
GLUCOSE QUALITATIVE U: NEGATIVE MG/DL
KETONES URINE: ABNORMAL MG/DL
LEUKOCYTE ESTERASE URINE: ABNORMAL
MICROSCOPIC-UA: NORMAL
NITRITE URINE: POSITIVE
PH URINE: 5.5
PROTEIN URINE: 30 MG/DL
RED BLOOD CELLS URINE: NORMAL /HPF
REVIEW: NORMAL
SPECIFIC GRAVITY URINE: 1.02
UROBILINOGEN URINE: 0.2 MG/DL
WHITE BLOOD CELLS URINE: 291 /HPF

## 2024-03-12 ENCOUNTER — NON-APPOINTMENT (OUTPATIENT)
Age: 74
End: 2024-03-12

## 2024-03-12 LAB — BACTERIA UR CULT: ABNORMAL

## 2024-03-12 RX ORDER — NITROFURANTOIN (MONOHYDRATE/MACROCRYSTALS) 25; 75 MG/1; MG/1
100 CAPSULE ORAL TWICE DAILY
Qty: 14 | Refills: 0 | Status: ACTIVE | COMMUNITY
Start: 2024-03-12 | End: 1900-01-01

## 2024-03-14 ENCOUNTER — RESULT REVIEW (OUTPATIENT)
Age: 74
End: 2024-03-14

## 2024-03-15 ENCOUNTER — NON-APPOINTMENT (OUTPATIENT)
Age: 74
End: 2024-03-15

## 2024-03-15 ENCOUNTER — APPOINTMENT (OUTPATIENT)
Dept: DERMATOLOGY | Facility: CLINIC | Age: 74
End: 2024-03-15
Payer: MEDICARE

## 2024-03-15 DIAGNOSIS — B37.31 ACUTE CANDIDIASIS OF VULVA AND VAGINA: ICD-10-CM

## 2024-03-15 PROCEDURE — 12034 INTMD RPR S/TR/EXT 7.6-12.5: CPT

## 2024-03-15 PROCEDURE — 11606 EXC TR-EXT MAL+MARG >4 CM: CPT

## 2024-03-15 RX ORDER — FLUCONAZOLE 150 MG/1
150 TABLET ORAL
Qty: 3 | Refills: 0 | Status: ACTIVE | COMMUNITY
Start: 2024-03-15 | End: 1900-01-01

## 2024-03-15 RX ORDER — CEPHALEXIN 500 MG/1
500 CAPSULE ORAL 3 TIMES DAILY
Qty: 21 | Refills: 0 | Status: ACTIVE | COMMUNITY
Start: 2024-03-15 | End: 1900-01-01

## 2024-03-15 NOTE — PHYSICAL EXAM
[Oriented x 3] : ~L oriented x 3 [Alert] : alert [Well Nourished] : well nourished [Conjunctiva Non-injected] : conjunctiva non-injected [No Visual Lymphadenopathy] : no visual  lymphadenopathy [No Clubbing] : no clubbing [No Edema] : no edema [No Bromhidrosis] : no bromhidrosis [Hair] : Hair [No Chromhidrosis] : no chromhidrosis [Scalp] : Scalp [Nose] : Nose [Face] : Face [Ears] : Ears [Eyelids] : Eyelids [Nodes] : Nodes [Lips] : Lips [FreeTextEntry3] : -right chest with 2.3 cm x 2.2 cm ill defined brown pink macule and adjacent 8 mm evenly pigmented brown macule

## 2024-03-15 NOTE — PROCEDURE
[TextEntry] : Excision Operative Note   Indications:  Alternative therapies were discussed. Given the size, location, and tumor type we decided that excision offers the best option for treatment.   Preoperative diagnosis: melanoma in situ Postoperative diagnosis: Same Location: right lateral superior chest Anesthetic: 0.5% lidocaine with 1:200,000 epinephrine Antiseptic: Chlorhexidine or Betadine Attending surgeon: Katrina Leal MD Assistant(s): Guanaco Duke RN, Clementina Priest MA Estimated blood loss: < 5cc Complications: None Initial lesion size: 2.3 cm x 2.2 cm  Surgical margin: 1.0 cm  Total excision size (always includes surgical margin):  4.3 cm x 4.2 cm  Closure type: intermediate linear layered Length of closure: 10.2 cm  Suture material: 3-0 vicryl, 4-0 chromic  The patient was brought back to the minor surgery operating room. Prior to the procedure the medical record was reviewed by the physician. A pre-procedure checklist in the presence of the patient was reviewed. A surgery " timeout " was observed. The following were confirmed during the surgery timeout: patient name, confirmation of consent, patient position, allergies, type of anesthesia, and antibiotic given (if any, see emr entry for any medications).  The risks of the procedure, including bleeding, infection, nerve damage, possibility of recurrence, failure of the repair, and the certainty of a scar were discussed with the patient. Alternatives to the procedure, as well as their risks and benefits, were also discussed. The patient was offered an opportunity to ask any questions and, after expressing understanding of the proposed procedure and its alternatives, the patient signed the consent form. The patient was placed in appropriate position and the area was prepared and draped in the usual manner. Local anesthesia was obtained with the above mentioned anesthetic. Using a sterile surgical marking pen, an elliptical (or circular) excision was designed around the lesion and along relaxed skin tension lines, if possible, incorporating the margin of normal-appearing skin mentioned above. A full thickness skin incision using a #15 blade Bard-Sandro scalpel was performed and the lesion was excised sharply in the subcutaneous plane.  The specimen was submitted for histopathologic examination.   The defect was moderately undermined in the subcutaneous plane if needed to reduce wound closure tension and allow for easy wound edge eversion.  Hemostasis was maintained with the electrosurgical unit.    Of note, when the wound was re-approximated the seperate brown pigmented macule adjacent to the melanoma was directly adjacent to the suture line, so decision was made to excise this to reduce possible confusion for clinical monitoring purposes. This was excised with the 15 blade scalpel and submitted seperately in formalin to Pathology.  Interrupted, inverted, subcuticular buried mattress sutures were placed using the material mentioned above to approximate the dermal edges of the wound. Interrupted and running simple cutaneous sutures were used to further approximate and merle the wound edges.  The final length of the repair is listed in the summary above. A firm pressure dressing was applied over vaseline ointment and Telfa. Verbal postoperative wound care instructions were given and a wound care hand out was dispensed.  The patient was asked to follow up for a wound check as specified. The patient was also told to call us at anytime for signs of wound infection or any other concerns they might have regarding the surgery or the healing process.   The patient tolerated the procedure well and ambulated from the operatory without problem.

## 2024-03-15 NOTE — HISTORY OF PRESENT ILLNESS
[de-identified] : Dermatologic Surgery Consultation  03/15/2024   Referred by: Dr. Campbell  We had the pleasure of seeing your patient in consultation for Dermatologic Surgery.  Ms. ARIEL SEYMOUR is a 73 year old F who presents for evaluation of a recently biopsied melanoma in situ of the right chest. Had been there x years but recently getting larger and darker. Has a previous history of melanoma on her forearm treated 5 years ago by Surgical Oncology.  Pertinent positives noted below  History of HIV or hepatitis: No Blood thinners: none Antibiotic Prophylaxis: None  Medical implants: None  Social History: no tobacco or alcohol   The patient's review of systems questionnaire was reviewed. Education needs were identified. There were no barriers to learning.  Dermatologic surgery consultation for melanoma in situ, Right Lateral Superior Chest  -- I explained the treatment recommendation of excision with 5-10 mm margins according to NCCN guidelines.  -due to the size of the lesion as well as significant amount of the lesion remaining I advised I recommend 1 cm excision margins -I advised that final pathologic staging is only possible after evaluation of the complete specimen following excision, and may result in a change to the final stage of the melanoma and further treatment and/or workup recommendations including additional excision as well as discussion of SLNB if indicated according to final tumor stage -I explained that following extirpation there will be a full thickness defect of the involved area. The reconstructive options will be based on the defect size and surrounding tissue laxity of the involved area. Primary closure is only possible for smaller defects. For larger defects, local tissue rearrangement or skin grafting may be necessary. Risks following layered primary closure or local tissue rearrangement include wound dehiscence, contour irregularity, bleeding, infection, and paresthesia (nerve damage including sensory deficit or motor weakness). Risks following skin grafting include wound dehiscence, skin graft nonadherence (partial or complete), contour irregularity, bleeding, infection, paresthesia, and donor site complications. I explained that the patient will need to abstain from physical activity for 1-2 weeks following the surgery, that they would heal with a scar, and also discussed the chances of infection, bleeding, potential sensory or motor nerve damage, and recurrence.  The patient indicated that s/he understood the risks and wished to proceed today -- In particular, for reconstruction we discussed linear closure  Thank you for this dermatologic surgery referral. We recommended that the patient follow up with their r referring dermatologist for routine skin exams following surgery.        [FreeTextEntry1] : Melanoma in Situ Right Lateral Superior Chest

## 2024-03-15 NOTE — ASSESSMENT
[FreeTextEntry1] : Melanoma in Situ (Stage 0), Right Lateral Superior Chest --excision with 1.0 cm margins performed today, will follow up on final pathology -an incidentally identified seperate brown macule adjacent to the excision site was also removed to reduce clinical confusion as would be directly adjacent to the suture line, submitted seperately to Pathology -- intermediate linear layered repair performed -- f/u for wound check PRN -Keflex 500 mg TID x 1 week for prophylaxis of infection. Patient is currently finishing a course of Macrobid for a UTI, we will send message to her prescriber to  -- f/u for routine skin exams as previously recommended inquire if she should stop it early after starting Keflex or continue -RTC for suture removal in 2 weeks  Thank you for this dermatologic surgery referral.   Reason MD Mel Physician, Dermatology & Dermatologic Surgery [facility], [place]

## 2024-03-22 ENCOUNTER — APPOINTMENT (OUTPATIENT)
Dept: UROGYNECOLOGY | Facility: CLINIC | Age: 74
End: 2024-03-22
Payer: MEDICARE

## 2024-03-22 VITALS — DIASTOLIC BLOOD PRESSURE: 61 MMHG | SYSTOLIC BLOOD PRESSURE: 96 MMHG

## 2024-03-22 PROCEDURE — 51700 IRRIGATION OF BLADDER: CPT

## 2024-03-27 ENCOUNTER — APPOINTMENT (OUTPATIENT)
Dept: DERMATOLOGY | Facility: CLINIC | Age: 74
End: 2024-03-27
Payer: MEDICARE

## 2024-03-27 DIAGNOSIS — D03.59 MELANOMA IN SITU OF OTHER PART OF TRUNK: ICD-10-CM

## 2024-03-27 PROCEDURE — 99212 OFFICE O/P EST SF 10 MIN: CPT

## 2024-03-28 PROBLEM — D03.59 MALIGNANT MELANOMA IN SITU OF SKIN OF ANTERIOR CHEST: Status: ACTIVE | Noted: 2024-03-15

## 2024-03-28 NOTE — ASSESSMENT
[FreeTextEntry1] : Melanoma in Situ (Stage 0), Right Lateral Superior Chest --excision with 1.0 cm margins performed 3/15/24, will follow up on final pathology -an incidentally identified seperate brown macule adjacent to the excision site was also removed to reduce clinical confusion as would be directly adjacent to the suture line, submitted seperately to Pathology -- intermediate linear layered repair performed -- f/u for wound check PRN -RTC for  FBSE in 3 mos  Thank you for this dermatologic surgery referral.   Reason MD eMl Physician, Dermatology & Dermatologic Surgery [facility]

## 2024-03-28 NOTE — PHYSICAL EXAM
[Alert] : alert [Oriented x 3] : ~L oriented x 3 [Conjunctiva Non-injected] : conjunctiva non-injected [Well Nourished] : well nourished [No Visual Lymphadenopathy] : no visual  lymphadenopathy [No Edema] : no edema [No Clubbing] : no clubbing [No Chromhidrosis] : no chromhidrosis [No Bromhidrosis] : no bromhidrosis [Hair] : Hair [Scalp] : Scalp [Face] : Face [Nose] : Nose [Ears] : Ears [Eyelids] : Eyelids [Lips] : Lips [Nodes] : Nodes [FreeTextEntry3] : -right chest with intact incision, sutures in place and removed today

## 2024-03-28 NOTE — HISTORY OF PRESENT ILLNESS
[FreeTextEntry1] : Wound Check s/p Excision of Melanoma in Situ Right Lateral Superior Chest 3/15/24 [de-identified] : 3/27/24  Ms. ARIEL SEYMOUR is a 73 year old F who presents for wound check s/p excision of melanoma in situ right lateral superior chest. She had excision with 1.0 cm margins as she had a large ill defined lesion with residual pigmentation present. Pathology is still pending.  ROS: Patient denies fever, chills, night sweats, unintentional weight loss or other constitutional symptoms

## 2024-04-05 ENCOUNTER — APPOINTMENT (OUTPATIENT)
Dept: UROGYNECOLOGY | Facility: CLINIC | Age: 74
End: 2024-04-05
Payer: MEDICARE

## 2024-04-05 VITALS — DIASTOLIC BLOOD PRESSURE: 66 MMHG | SYSTOLIC BLOOD PRESSURE: 112 MMHG

## 2024-04-05 PROCEDURE — 51700 IRRIGATION OF BLADDER: CPT

## 2024-04-11 ENCOUNTER — NON-APPOINTMENT (OUTPATIENT)
Age: 74
End: 2024-04-11

## 2024-04-11 LAB
APPEARANCE: CLEAR
BACTERIA UR CULT: NORMAL
BACTERIA: NEGATIVE /HPF
BILIRUBIN URINE: NEGATIVE
BLOOD URINE: NEGATIVE
CAST: 0 /LPF
COLOR: NORMAL
EPITHELIAL CELLS: 0 /HPF
GLUCOSE QUALITATIVE U: NEGATIVE MG/DL
KETONES URINE: NEGATIVE MG/DL
LEUKOCYTE ESTERASE URINE: NEGATIVE
MICROSCOPIC-UA: NORMAL
NITRITE URINE: NEGATIVE
PH URINE: 6
PROTEIN URINE: NEGATIVE MG/DL
RED BLOOD CELLS URINE: 1 /HPF
SPECIFIC GRAVITY URINE: 1.02
UROBILINOGEN URINE: 0.2 MG/DL
WHITE BLOOD CELLS URINE: 0 /HPF

## 2024-04-19 ENCOUNTER — APPOINTMENT (OUTPATIENT)
Dept: UROGYNECOLOGY | Facility: CLINIC | Age: 74
End: 2024-04-19
Payer: MEDICARE

## 2024-04-19 PROCEDURE — 51700 IRRIGATION OF BLADDER: CPT

## 2024-04-23 ENCOUNTER — APPOINTMENT (OUTPATIENT)
Dept: DERMATOLOGY | Facility: CLINIC | Age: 74
End: 2024-04-23
Payer: MEDICARE

## 2024-04-23 PROCEDURE — 99213 OFFICE O/P EST LOW 20 MIN: CPT

## 2024-04-24 ENCOUNTER — NON-APPOINTMENT (OUTPATIENT)
Age: 74
End: 2024-04-24

## 2024-04-25 ENCOUNTER — APPOINTMENT (OUTPATIENT)
Dept: NEUROLOGY | Facility: CLINIC | Age: 74
End: 2024-04-25
Payer: MEDICARE

## 2024-04-25 ENCOUNTER — APPOINTMENT (OUTPATIENT)
Dept: NEUROLOGY | Facility: CLINIC | Age: 74
End: 2024-04-25

## 2024-04-25 VITALS
HEART RATE: 60 BPM | DIASTOLIC BLOOD PRESSURE: 66 MMHG | WEIGHT: 128 LBS | HEIGHT: 63 IN | SYSTOLIC BLOOD PRESSURE: 113 MMHG | BODY MASS INDEX: 22.68 KG/M2

## 2024-04-25 VITALS — HEART RATE: 71 BPM | DIASTOLIC BLOOD PRESSURE: 66 MMHG | SYSTOLIC BLOOD PRESSURE: 98 MMHG

## 2024-04-25 PROCEDURE — G2211 COMPLEX E/M VISIT ADD ON: CPT

## 2024-04-25 PROCEDURE — 99215 OFFICE O/P EST HI 40 MIN: CPT

## 2024-05-02 ENCOUNTER — RX RENEWAL (OUTPATIENT)
Age: 74
End: 2024-05-02

## 2024-05-02 RX ORDER — CLOBETASOL PROPIONATE 0.5 MG/G
0.05 OINTMENT TOPICAL
Qty: 15 | Refills: 0 | Status: ACTIVE | COMMUNITY
Start: 2023-01-27 | End: 1900-01-01

## 2024-05-03 ENCOUNTER — APPOINTMENT (OUTPATIENT)
Dept: UROGYNECOLOGY | Facility: CLINIC | Age: 74
End: 2024-05-03
Payer: MEDICARE

## 2024-05-03 VITALS — DIASTOLIC BLOOD PRESSURE: 65 MMHG | SYSTOLIC BLOOD PRESSURE: 95 MMHG

## 2024-05-03 PROCEDURE — 51700 IRRIGATION OF BLADDER: CPT

## 2024-05-17 ENCOUNTER — APPOINTMENT (OUTPATIENT)
Dept: UROGYNECOLOGY | Facility: CLINIC | Age: 74
End: 2024-05-17

## 2024-05-24 ENCOUNTER — APPOINTMENT (OUTPATIENT)
Dept: NEUROLOGY | Facility: CLINIC | Age: 74
End: 2024-05-24

## 2024-05-31 ENCOUNTER — APPOINTMENT (OUTPATIENT)
Dept: UROGYNECOLOGY | Facility: CLINIC | Age: 74
End: 2024-05-31
Payer: MEDICARE

## 2024-05-31 VITALS — DIASTOLIC BLOOD PRESSURE: 56 MMHG | SYSTOLIC BLOOD PRESSURE: 94 MMHG

## 2024-05-31 DIAGNOSIS — N39.0 URINARY TRACT INFECTION, SITE NOT SPECIFIED: ICD-10-CM

## 2024-05-31 DIAGNOSIS — N95.8 OTHER SPECIFIED MENOPAUSAL AND PERIMENOPAUSAL DISORDERS: ICD-10-CM

## 2024-05-31 DIAGNOSIS — R10.2 PELVIC AND PERINEAL PAIN: ICD-10-CM

## 2024-05-31 DIAGNOSIS — M79.18 MYALGIA, OTHER SITE: ICD-10-CM

## 2024-05-31 DIAGNOSIS — N90.4 LEUKOPLAKIA OF VULVA: ICD-10-CM

## 2024-05-31 DIAGNOSIS — G89.29 PELVIC AND PERINEAL PAIN: ICD-10-CM

## 2024-05-31 DIAGNOSIS — N30.10 INTERSTITIAL CYSTITIS (CHRONIC) W/OUT HEMATURIA: ICD-10-CM

## 2024-05-31 PROCEDURE — 99213 OFFICE O/P EST LOW 20 MIN: CPT | Mod: 25

## 2024-05-31 PROCEDURE — 51700 IRRIGATION OF BLADDER: CPT

## 2024-05-31 NOTE — PROCEDURE
[Patient] : the patient [Other ___] : [unfilled] [Consent Obtained] : written consent was obtained prior to the procedure and is detailed in the patient's record [No] : Specimen not sent to Pathology [No Complications] : none [Tolerated Well] : the patient tolerated the procedure well [Post procedure instructions and information given] : post procedure instructions and information given and reviewed with patient. [0] : 0 [FreeTextEntry1] : SEE NOTE BELOW

## 2024-05-31 NOTE — PHYSICAL EXAM
[No Acute Distress] : in no acute distress [Well developed] : well developed [Well Nourished] : ~L well nourished [Good Hygeine] : demonstrates good hygeine [Oriented x3] : oriented to person, place, and time [Normal Memory] : ~T memory was ~L unimpaired [Normal Mood/Affect] : mood and affect are normal [Warm and Dry] : was warm and dry to touch [No Edema] : ~T edema was not present [Normal Gait] : gait was normal [No Lesions] : no lesions were seen on the external genitalia [Vulvar Atrophy] : vulvar atrophy [Normal] : was normal [Normal Appearance] : general appearance was normal [Atrophy] : atrophy [No Bleeding] : there was no active vaginal bleeding [Tenderness] : tenderness [Exam Deferred] : was deferred [de-identified] : Q-tip touch test 2/10 @ 5,6,7 with mild erythema posterior fourchette. Derm changes c/s LS. No ulcerations, fissures, erosions.  [de-identified] : Derm changes c/w vLS, improved compared to previous exam. [FreeTextEntry3] : Caruncle resolved [FreeTextEntry4] : PFMs 2/4, supple, non-tender to palpation [de-identified] : posterior bladder wall mildly tender to palpation

## 2024-05-31 NOTE — DISCUSSION/SUMMARY
[FreeTextEntry1] : An intravesical anesthetic instillation was instilled into the bladder.  The components of the instillation include: 10cc Lidocaine 4%  5cc Sodium Bicarb 8.4% 2cc Gentamicin 80mg/2ml  2cc Heparin 10,000U/ml 1cc Triamcinolone 40mg/ml  The urethra was prepped and draped with chlorhexadine and a 10Fr catheter was inserted in a sterile fashion; all urine was drained. The medication was then instilled into the bladder. The catheter was removed and the patient was instructed to void in 2 hours.  The patient tolerated the procedure well without complaints. She was given precautions on urinary retention and urinary tract infections. She verbalized understanding of all information given and will call the office with any questions or concerns.   May use Pyridium 200mg 1 tab po qd with dinner prn. May take additional tab during the day if needed for BPS symptoms. Take with food, increase water intake.   Continue V5 suppositories PV hs 2-3x/week. Continue warm baths daily. Continue dilation, stretching, HEP at home.  Continue topical estradiol ftp to introitus hs 2-3x/week. Continue Clobetasol 0.05% ointment pea-size amt after bath/shower to areas of vLS 2-3x/week.   UA and C&S was sent from cathed specimen.  RTO q 3 weeks BLIs RTO 4 months

## 2024-05-31 NOTE — HISTORY OF PRESENT ILLNESS
[FreeTextEntry1] : Vane is here for a f/u visit.  She reports significant improvement in her IC/BPS symptoms. Since starting intravesical therapy, she endorses decreased pain with bladder filling. No hematuria, CVAT, fever, chills.  She has only had UTI x 1 3/18/24: 10-49K e coli (I - cipro) treated with macrobid BID x 7d.  She is taking pyridium prn, approx 2x/week prn. She is requesting UA and C&S today.  She reports that BLIs have significantly improved bladder pain/pressure.  She is able to hold the instillation for 2-3 hours and void without difficulty. She would like to extend time interval b/t BLIs to q 3 weeks.   She is using topical estrace ftp to introitus hs 2-3x/week. She denies all vulvar burning, itching, irritation at the moment.  She is using clobetasol to areas of vLS 2x/week.  She is using V5 suppositories PV approx 2x/week. She is finished PFPT with Steffany.  She is doing stretches, HEP at home. She is dilating weekly.  She is on abx for diverticulitis.  She needs to schedule a colonoscopy when her infection resolves.  Her granddaughter is getting  on Saturday.

## 2024-06-04 LAB
APPEARANCE: CLEAR
BACTERIA UR CULT: NORMAL
BACTERIA: NEGATIVE /HPF
BILIRUBIN URINE: NEGATIVE
BLOOD URINE: NEGATIVE
CAST: 0 /LPF
COLOR: NORMAL
EPITHELIAL CELLS: 1 /HPF
GLUCOSE QUALITATIVE U: NEGATIVE MG/DL
KETONES URINE: ABNORMAL MG/DL
LEUKOCYTE ESTERASE URINE: ABNORMAL
MICROSCOPIC-UA: NORMAL
NITRITE URINE: NEGATIVE
PH URINE: 6.5
PROTEIN URINE: NEGATIVE MG/DL
RED BLOOD CELLS URINE: 2 /HPF
SPECIFIC GRAVITY URINE: 1.02
UROBILINOGEN URINE: 0.2 MG/DL
WHITE BLOOD CELLS URINE: 1 /HPF

## 2024-06-13 ENCOUNTER — NON-APPOINTMENT (OUTPATIENT)
Age: 74
End: 2024-06-13

## 2024-06-20 ENCOUNTER — EMERGENCY (EMERGENCY)
Facility: HOSPITAL | Age: 74
LOS: 1 days | Discharge: HOME | End: 2024-06-20
Attending: STUDENT IN AN ORGANIZED HEALTH CARE EDUCATION/TRAINING PROGRAM
Payer: MEDICARE

## 2024-06-20 VITALS
DIASTOLIC BLOOD PRESSURE: 71 MMHG | SYSTOLIC BLOOD PRESSURE: 116 MMHG | RESPIRATION RATE: 20 BRPM | TEMPERATURE: 98 F | HEART RATE: 65 BPM | OXYGEN SATURATION: 100 % | WEIGHT: 123.02 LBS

## 2024-06-20 VITALS
OXYGEN SATURATION: 100 % | RESPIRATION RATE: 18 BRPM | HEART RATE: 59 BPM | TEMPERATURE: 98 F | SYSTOLIC BLOOD PRESSURE: 140 MMHG | DIASTOLIC BLOOD PRESSURE: 92 MMHG

## 2024-06-20 DIAGNOSIS — Z98.89 OTHER SPECIFIED POSTPROCEDURAL STATES: Chronic | ICD-10-CM

## 2024-06-20 LAB
ALBUMIN SERPL ELPH-MCNC: 4.4 G/DL — SIGNIFICANT CHANGE UP (ref 3.3–5.2)
ALP SERPL-CCNC: 53 U/L — SIGNIFICANT CHANGE UP (ref 40–120)
ALT FLD-CCNC: <5 U/L — SIGNIFICANT CHANGE UP
ANION GAP SERPL CALC-SCNC: 14 MMOL/L — SIGNIFICANT CHANGE UP (ref 5–17)
APPEARANCE UR: CLEAR — SIGNIFICANT CHANGE UP
AST SERPL-CCNC: 19 U/L — SIGNIFICANT CHANGE UP
BACTERIA # UR AUTO: NEGATIVE /HPF — SIGNIFICANT CHANGE UP
BASOPHILS # BLD AUTO: 0.03 K/UL — SIGNIFICANT CHANGE UP (ref 0–0.2)
BASOPHILS NFR BLD AUTO: 0.3 % — SIGNIFICANT CHANGE UP (ref 0–2)
BILIRUB SERPL-MCNC: 0.4 MG/DL — SIGNIFICANT CHANGE UP (ref 0.4–2)
BILIRUB UR-MCNC: NEGATIVE — SIGNIFICANT CHANGE UP
BUN SERPL-MCNC: 22.9 MG/DL — HIGH (ref 8–20)
CALCIUM SERPL-MCNC: 10 MG/DL — SIGNIFICANT CHANGE UP (ref 8.4–10.5)
CAST: 0 /LPF — SIGNIFICANT CHANGE UP (ref 0–4)
CHLORIDE SERPL-SCNC: 98 MMOL/L — SIGNIFICANT CHANGE UP (ref 96–108)
CO2 SERPL-SCNC: 26 MMOL/L — SIGNIFICANT CHANGE UP (ref 22–29)
COLOR SPEC: SIGNIFICANT CHANGE UP
CREAT SERPL-MCNC: 0.87 MG/DL — SIGNIFICANT CHANGE UP (ref 0.5–1.3)
DIFF PNL FLD: NEGATIVE — SIGNIFICANT CHANGE UP
EGFR: 69 ML/MIN/1.73M2 — SIGNIFICANT CHANGE UP
EOSINOPHIL # BLD AUTO: 0.04 K/UL — SIGNIFICANT CHANGE UP (ref 0–0.5)
EOSINOPHIL NFR BLD AUTO: 0.4 % — SIGNIFICANT CHANGE UP (ref 0–6)
GLUCOSE SERPL-MCNC: 94 MG/DL — SIGNIFICANT CHANGE UP (ref 70–99)
GLUCOSE UR QL: NEGATIVE MG/DL — SIGNIFICANT CHANGE UP
HCT VFR BLD CALC: 45.3 % — HIGH (ref 34.5–45)
HGB BLD-MCNC: 15.4 G/DL — SIGNIFICANT CHANGE UP (ref 11.5–15.5)
IMM GRANULOCYTES NFR BLD AUTO: 0.7 % — SIGNIFICANT CHANGE UP (ref 0–0.9)
KETONES UR-MCNC: ABNORMAL MG/DL
LEUKOCYTE ESTERASE UR-ACNC: ABNORMAL
LIDOCAIN IGE QN: 49 U/L — SIGNIFICANT CHANGE UP (ref 22–51)
LYMPHOCYTES # BLD AUTO: 1.61 K/UL — SIGNIFICANT CHANGE UP (ref 1–3.3)
LYMPHOCYTES # BLD AUTO: 16.3 % — SIGNIFICANT CHANGE UP (ref 13–44)
MAGNESIUM SERPL-MCNC: 2.1 MG/DL — SIGNIFICANT CHANGE UP (ref 1.8–2.6)
MCHC RBC-ENTMCNC: 29.3 PG — SIGNIFICANT CHANGE UP (ref 27–34)
MCHC RBC-ENTMCNC: 34 GM/DL — SIGNIFICANT CHANGE UP (ref 32–36)
MCV RBC AUTO: 86.3 FL — SIGNIFICANT CHANGE UP (ref 80–100)
MONOCYTES # BLD AUTO: 0.49 K/UL — SIGNIFICANT CHANGE UP (ref 0–0.9)
MONOCYTES NFR BLD AUTO: 5 % — SIGNIFICANT CHANGE UP (ref 2–14)
NEUTROPHILS # BLD AUTO: 7.63 K/UL — HIGH (ref 1.8–7.4)
NEUTROPHILS NFR BLD AUTO: 77.3 % — HIGH (ref 43–77)
NITRITE UR-MCNC: NEGATIVE — SIGNIFICANT CHANGE UP
PH UR: 6 — SIGNIFICANT CHANGE UP (ref 5–8)
PLATELET # BLD AUTO: 301 K/UL — SIGNIFICANT CHANGE UP (ref 150–400)
POTASSIUM SERPL-MCNC: 4.9 MMOL/L — SIGNIFICANT CHANGE UP (ref 3.5–5.3)
POTASSIUM SERPL-SCNC: 4.9 MMOL/L — SIGNIFICANT CHANGE UP (ref 3.5–5.3)
PROT SERPL-MCNC: 7.1 G/DL — SIGNIFICANT CHANGE UP (ref 6.6–8.7)
PROT UR-MCNC: NEGATIVE MG/DL — SIGNIFICANT CHANGE UP
RBC # BLD: 5.25 M/UL — HIGH (ref 3.8–5.2)
RBC # FLD: 12.1 % — SIGNIFICANT CHANGE UP (ref 10.3–14.5)
RBC CASTS # UR COMP ASSIST: 0 /HPF — SIGNIFICANT CHANGE UP (ref 0–4)
SODIUM SERPL-SCNC: 138 MMOL/L — SIGNIFICANT CHANGE UP (ref 135–145)
SP GR SPEC: 1.02 — SIGNIFICANT CHANGE UP (ref 1–1.03)
SQUAMOUS # UR AUTO: 2 /HPF — SIGNIFICANT CHANGE UP (ref 0–5)
TROPONIN T, HIGH SENSITIVITY RESULT: 8 NG/L — SIGNIFICANT CHANGE UP (ref 0–51)
UROBILINOGEN FLD QL: 0.2 MG/DL — SIGNIFICANT CHANGE UP (ref 0.2–1)
WBC # BLD: 9.87 K/UL — SIGNIFICANT CHANGE UP (ref 3.8–10.5)
WBC # FLD AUTO: 9.87 K/UL — SIGNIFICANT CHANGE UP (ref 3.8–10.5)
WBC UR QL: 2 /HPF — SIGNIFICANT CHANGE UP (ref 0–5)

## 2024-06-20 PROCEDURE — 81001 URINALYSIS AUTO W/SCOPE: CPT

## 2024-06-20 PROCEDURE — 84484 ASSAY OF TROPONIN QUANT: CPT

## 2024-06-20 PROCEDURE — 99284 EMERGENCY DEPT VISIT MOD MDM: CPT | Mod: FS

## 2024-06-20 PROCEDURE — 87086 URINE CULTURE/COLONY COUNT: CPT

## 2024-06-20 PROCEDURE — 85025 COMPLETE CBC W/AUTO DIFF WBC: CPT

## 2024-06-20 PROCEDURE — 71046 X-RAY EXAM CHEST 2 VIEWS: CPT

## 2024-06-20 PROCEDURE — 99285 EMERGENCY DEPT VISIT HI MDM: CPT | Mod: 25

## 2024-06-20 PROCEDURE — 80053 COMPREHEN METABOLIC PANEL: CPT

## 2024-06-20 PROCEDURE — 36415 COLL VENOUS BLD VENIPUNCTURE: CPT

## 2024-06-20 PROCEDURE — 83735 ASSAY OF MAGNESIUM: CPT

## 2024-06-20 PROCEDURE — 83690 ASSAY OF LIPASE: CPT

## 2024-06-20 PROCEDURE — 93010 ELECTROCARDIOGRAM REPORT: CPT | Mod: 76

## 2024-06-20 PROCEDURE — 93005 ELECTROCARDIOGRAM TRACING: CPT

## 2024-06-20 PROCEDURE — 71046 X-RAY EXAM CHEST 2 VIEWS: CPT | Mod: 26

## 2024-06-20 RX ORDER — SODIUM CHLORIDE 9 MG/ML
1000 INJECTION INTRAMUSCULAR; INTRAVENOUS; SUBCUTANEOUS ONCE
Refills: 0 | Status: COMPLETED | OUTPATIENT
Start: 2024-06-20 | End: 2024-06-20

## 2024-06-20 RX ORDER — SODIUM CHLORIDE 9 MG/ML
1000 INJECTION, SOLUTION INTRAVENOUS ONCE
Refills: 0 | Status: DISCONTINUED | OUTPATIENT
Start: 2024-06-20 | End: 2024-06-20

## 2024-06-20 RX ORDER — SODIUM CHLORIDE 9 MG/ML
500 INJECTION, SOLUTION INTRAVENOUS
Refills: 0 | Status: COMPLETED | OUTPATIENT
Start: 2024-06-20 | End: 2024-06-20

## 2024-06-20 RX ADMIN — SODIUM CHLORIDE 500 MILLILITER(S): 9 INJECTION, SOLUTION INTRAVENOUS at 18:35

## 2024-06-20 RX ADMIN — SODIUM CHLORIDE 1000 MILLILITER(S): 9 INJECTION INTRAMUSCULAR; INTRAVENOUS; SUBCUTANEOUS at 15:58

## 2024-06-20 NOTE — ED PROVIDER NOTE - CLINICAL SUMMARY MEDICAL DECISION MAKING FREE TEXT BOX
73-year-old female with past medical history of Parkinson's, hypothyroidism, GERD presents with nausea, vomiting, body aches recently diagnosed with COVID and started on Paxlovid - Patient also reports cough but now having some chest pain with coughing.  Will do EKG, labs, chest x-ray evaluate for dehydration, ACS, pancreatitis 73-year-old female with past medical history of Parkinson's, hypothyroidism, GERD presents with nausea, vomiting, body aches recently diagnosed with COVID and started on Paxlovid - Patient also reports cough but now having some chest pain with coughing.  Will do EKG, labs, chest x-ray evaluate for pna also eval for  dehydration, ACS, pancreatitis  ekg No acute ischemic changes sinus rate 56  QRS 68 QTc 405 73-year-old female with past medical history of Parkinson's, hypothyroidism, GERD presents with nausea, vomiting, body aches recently diagnosed with COVID and started on Paxlovid - Patient also reports cough but now having some chest pain with coughing.  Will do EKG, labs, chest x-ray evaluate for pna also eval for  dehydration, ACS, pancreatitis    Symptoms may be due to starting a new antibiotic as well as the antiviral  ekg No acute ischemic changes sinus rate 56  QRS 68 QTc 405

## 2024-06-20 NOTE — ED ADULT TRIAGE NOTE - CHIEF COMPLAINT QUOTE
pt arrives to the ED c/o N/V/D/COVID+, pt has Parkinson's, pt on Paxlovid, pt states that she had 5 episodes of vomiting

## 2024-06-20 NOTE — ED PROVIDER NOTE - OBJECTIVE STATEMENT
73-year-old female with past medical history of Parkinson's, hypothyroidism, GERD presents with nausea, vomiting, body aches.  Patient states a week ago she was diagnosed with COVID after being at a wedding started on Paxlovid finished last dose yesterday.  Patient states for the past couple days with the symptoms.  Patient now with generalized weakness.  Patient states when she is dehydrated her Parkinson's symptoms become worse. Pt denies fevers/chills, ha, loc, focal neuro deficits, cp/sob/palp, cough, abd pain, urinary symptoms, recent travel.

## 2024-06-20 NOTE — ED ADULT NURSE NOTE - CINV DISCH TEACH PARTICIP
Called re: holter results. SB with no rates below 50. She doesn't recall getting dizzy while wearing the monitor. She gets dizzy when standing up too fast. This has gotten better though since decreasing her metoprolol dose to 12.5 mg b.i.d.  She would like to keep as is for now, as she is feeling okay other than having a cold right now. Told her to increase her fluid intake due to dizziness with standing up. She will call us back if her symptoms worsen or she starts getting dizzy at times other than with standing up and changing positions.     Patient

## 2024-06-20 NOTE — ED PROVIDER NOTE - PROGRESS NOTE DETAILS
Patient states Pt moved form intake Room. Pt seen and evaluated by intake Physician. HPI, Physical examination performed by intake Physician . Note reviewed and followup examination performed by me consistent with initial assessment. Agrees with intake Physician plan and tests. Pt Labs, are within normal limits. Results discussed with patient and pt states understanding.  A copy of labs were provided upon discharge.  Pt states that she feels a lot better. Pt D/C in stable condition and will F/U with her PCP as discussed .

## 2024-06-20 NOTE — ED ADULT NURSE NOTE - SUICIDE SCREENING QUESTION 3
No Initiate Treatment: Absorica LD 24mg bid Render In Strict Bullet Format?: No Detail Level: Zone Discontinue Regimen: Isotretinoin 40mg qd

## 2024-06-20 NOTE — ED PROVIDER NOTE - NSFOLLOWUPINSTRUCTIONS_ED_ALL_ED_FT
Please continue with COVID19  treatment as per medical provider.  Please follow-up with your primary care provider as discussed

## 2024-06-20 NOTE — ED ADULT NURSE NOTE - OBJECTIVE STATEMENT
Pt A&Ox4 in ST c/o n/v, generalized weakness and generalized chest discomfort on deep inspiration. Diagnosed with COVID-19 1 week ago. Endorses UTI symptoms since yesterday. PMHx of parkinson's, today felt weak and unsteady. Denies HA, dizziness, sob, fevers. Airway patent. Respirations even and unlabored. On cardiac monitor with . Bed locked in lowest position with siderails raised. Patent IV in place and IVF in progress.

## 2024-06-20 NOTE — ED PROVIDER NOTE - PATIENT PORTAL LINK FT
You can access the FollowMyHealth Patient Portal offered by [facility] by registering at the following website: [email or web address]. By joining [facility]’s FollowMyHealth portal, you will also be able to view your health information using other applications (apps) compatible with our system.

## 2024-06-21 ENCOUNTER — APPOINTMENT (OUTPATIENT)
Dept: UROGYNECOLOGY | Facility: CLINIC | Age: 74
End: 2024-06-21

## 2024-06-22 LAB
CULTURE RESULTS: SIGNIFICANT CHANGE UP
SPECIMEN SOURCE: SIGNIFICANT CHANGE UP

## 2024-07-12 ENCOUNTER — APPOINTMENT (OUTPATIENT)
Dept: UROGYNECOLOGY | Facility: CLINIC | Age: 74
End: 2024-07-12
Payer: MEDICARE

## 2024-07-12 VITALS — SYSTOLIC BLOOD PRESSURE: 92 MMHG | DIASTOLIC BLOOD PRESSURE: 60 MMHG | HEART RATE: 62 BPM

## 2024-07-12 DIAGNOSIS — N30.10 INTERSTITIAL CYSTITIS (CHRONIC) W/OUT HEMATURIA: ICD-10-CM

## 2024-07-12 PROCEDURE — 51700 IRRIGATION OF BLADDER: CPT

## 2024-07-16 LAB
APPEARANCE: CLEAR
BACTERIA UR CULT: NORMAL
BILIRUBIN URINE: NEGATIVE
BLOOD URINE: NEGATIVE
CAST: 0 /LPF
COLOR: NORMAL
EPITHELIAL CELLS: 0 /HPF
GLUCOSE QUALITATIVE U: NEGATIVE MG/DL
LEUKOCYTE ESTERASE URINE: NEGATIVE
NITRITE URINE: NEGATIVE
PROTEIN URINE: NEGATIVE MG/DL
RED BLOOD CELLS URINE: 2 /HPF
SPECIFIC GRAVITY URINE: 1.02
UROBILINOGEN URINE: 0.2 MG/DL
WHITE BLOOD CELLS URINE: 0 /HPF

## 2024-07-23 ENCOUNTER — APPOINTMENT (OUTPATIENT)
Dept: DERMATOLOGY | Facility: CLINIC | Age: 74
End: 2024-07-23

## 2024-07-25 ENCOUNTER — APPOINTMENT (OUTPATIENT)
Dept: NEUROLOGY | Facility: CLINIC | Age: 74
End: 2024-07-25

## 2024-07-31 ENCOUNTER — OFFICE (OUTPATIENT)
Dept: URBAN - METROPOLITAN AREA CLINIC 6 | Facility: CLINIC | Age: 74
Setting detail: OPHTHALMOLOGY
End: 2024-07-31
Payer: MEDICARE

## 2024-07-31 DIAGNOSIS — H35.073: ICD-10-CM

## 2024-07-31 DIAGNOSIS — H16.223: ICD-10-CM

## 2024-07-31 DIAGNOSIS — H02.831: ICD-10-CM

## 2024-07-31 DIAGNOSIS — H43.813: ICD-10-CM

## 2024-07-31 DIAGNOSIS — H35.072: ICD-10-CM

## 2024-07-31 DIAGNOSIS — H35.071: ICD-10-CM

## 2024-07-31 DIAGNOSIS — Z96.1: ICD-10-CM

## 2024-07-31 DIAGNOSIS — H35.3131: ICD-10-CM

## 2024-07-31 DIAGNOSIS — H02.834: ICD-10-CM

## 2024-07-31 PROCEDURE — 92014 COMPRE OPH EXAM EST PT 1/>: CPT | Performed by: OPHTHALMOLOGY

## 2024-07-31 ASSESSMENT — CONFRONTATIONAL VISUAL FIELD TEST (CVF)
OS_FINDINGS: FULL
OD_FINDINGS: FULL

## 2024-07-31 ASSESSMENT — LID POSITION - DERMATOCHALASIS
OD_DERMATOCHALASIS: RUL 1+ 2+
OS_DERMATOCHALASIS: LUL 1+ 2+

## 2024-08-02 ENCOUNTER — APPOINTMENT (OUTPATIENT)
Dept: UROGYNECOLOGY | Facility: CLINIC | Age: 74
End: 2024-08-02
Payer: MEDICARE

## 2024-08-02 VITALS — SYSTOLIC BLOOD PRESSURE: 99 MMHG | DIASTOLIC BLOOD PRESSURE: 64 MMHG

## 2024-08-02 DIAGNOSIS — N30.10 INTERSTITIAL CYSTITIS (CHRONIC) W/OUT HEMATURIA: ICD-10-CM

## 2024-08-02 PROCEDURE — 51700 IRRIGATION OF BLADDER: CPT

## 2024-08-05 ENCOUNTER — NON-APPOINTMENT (OUTPATIENT)
Age: 74
End: 2024-08-05

## 2024-08-22 ENCOUNTER — APPOINTMENT (OUTPATIENT)
Dept: NEUROLOGY | Facility: CLINIC | Age: 74
End: 2024-08-22
Payer: MEDICARE

## 2024-08-22 VITALS
HEART RATE: 62 BPM | BODY MASS INDEX: 22.68 KG/M2 | DIASTOLIC BLOOD PRESSURE: 64 MMHG | SYSTOLIC BLOOD PRESSURE: 100 MMHG | HEIGHT: 63 IN | WEIGHT: 128 LBS

## 2024-08-22 PROCEDURE — 99215 OFFICE O/P EST HI 40 MIN: CPT

## 2024-08-22 RX ORDER — LEVOTHYROXINE SODIUM 125 UG/1
125 TABLET ORAL
Refills: 0 | Status: ACTIVE | COMMUNITY

## 2024-08-22 NOTE — PHYSICAL EXAM
[FreeTextEntry1] : Patient with normal cognitive function, mild hypomimia with diminished blinking, mild reduction of voice volume but no significant dysarthria. There is mild rigidity with some cogwheeling, present bilaterally, more evident on the left side. Mild intermittent resting tremor is present in the left hand, and occasionally in the right. There is a mild intermittent R leg tremor. There is mild loss of motor dexterity, with decrement at rapid sequential and rapid alternating movements, also slightly more evident on the left side. Foot tapping is slightly impaired, also with the left side more affected. Posture is mildly stooped, with some shortening of gait stride and mild asymmetry with left reduction, and decrease arm swing, more evident on the left side. Postural reflexes are normal. Mild intermittant dyskinesias of the R hand.

## 2024-08-22 NOTE — ASSESSMENT
[FreeTextEntry1] : 74 yo RH female patient was diagnosed with Parkinson's disease in 2019 with family h/o of PD (mother had PD with autonomic dysfunction) with PD symptoms since 2016, with loss of dexterity, changes in handwriting, and more recently tremor in the right hand. She was diagnosed with PD after a MRI and Muna Scan done a few months ago in early 2019, and she has been on Sinemet 25/100 3 x day for less than 3 months.  Pt was diagnosed macular telangiectasis and Lichen Sclerosis. Pt had melanoma surgery at the end of March which has caused a setback in her PD related symptoms.  Overall she reports great variability. She continues to have OH lightheadedness. She had a fall a few weeks ago, which may have been related to a drop in BP. Overall, there has been no progression in disease. She continues to ambulate independently without assistive device.  Pts cardiologist increased to 5 mg TID. Pt has tolerated it well. Remains very active   A/Plan: Overall stable with no evidence of progression Medications: - Stalevo 125 QID at 7 am, 11 am, 3 pm, and 7 pm - Miralax QAM for constipation -Levothyroxine 125 mg qd -Midodrine 5 mg TID Florinef 0.1 mg qd  Famotidine Levothyroxine  Encouraged to increase exercise and physical activity and maintain an active social and intellectual life.

## 2024-08-22 NOTE — HISTORY OF PRESENT ILLNESS
[FreeTextEntry1] : 74 yo RH female patient was diagnosed with Parkinson's disease in 2019 with family h/o of PD (mother had PD with autonomic dysfunction) with PD symptoms since 2016, with loss of dexterity, changes in handwriting, and more recently tremor in the right hand. She was diagnosed with PD after a MRI and Muna Scan done a few months ago in early 2019, and she has been on Sinemet 25/100 3 x day for less than 3 months.  Pt was diagnosed macular telangiectasis and Lichen Sclerosis. Pt had melanoma surgery at the end of March which has caused a setback in her PD related symptoms.  Overall she reports great variability. She continues to have OH lightheadedness. She had a fall a few weeks ago, which may have been related to a drop in BP. Overall, there has been no progression in disease. She continues to ambulate independently without assistive device.  Pts cardiologist increased to 5 mg TID. Pt has tolerated it well.  Medications: - Stalevo 125 QID at 7 am, 11 am, 3 pm, and 7 pm - Miralax QAM for constipation -Levothyroxine 125 mg qd -Midodrine 5 mg TID Florinef 0.1 mg qd  Famotidine Levothyroxine

## 2024-08-23 ENCOUNTER — APPOINTMENT (OUTPATIENT)
Dept: UROGYNECOLOGY | Facility: CLINIC | Age: 74
End: 2024-08-23
Payer: MEDICARE

## 2024-08-23 VITALS — DIASTOLIC BLOOD PRESSURE: 65 MMHG | SYSTOLIC BLOOD PRESSURE: 102 MMHG

## 2024-08-23 PROCEDURE — 51700 IRRIGATION OF BLADDER: CPT

## 2024-09-13 ENCOUNTER — APPOINTMENT (OUTPATIENT)
Dept: UROGYNECOLOGY | Facility: CLINIC | Age: 74
End: 2024-09-13
Payer: MEDICARE

## 2024-09-13 VITALS — DIASTOLIC BLOOD PRESSURE: 59 MMHG | HEART RATE: 56 BPM | SYSTOLIC BLOOD PRESSURE: 91 MMHG

## 2024-09-13 DIAGNOSIS — N30.10 INTERSTITIAL CYSTITIS (CHRONIC) W/OUT HEMATURIA: ICD-10-CM

## 2024-09-13 PROCEDURE — 51700 IRRIGATION OF BLADDER: CPT

## 2024-09-16 LAB
APPEARANCE: CLEAR
BACTERIA UR CULT: NORMAL
BACTERIA: NEGATIVE /HPF
BILIRUBIN URINE: NEGATIVE
BLOOD URINE: NEGATIVE
CAST: 0 /LPF
COLOR: NORMAL
EPITHELIAL CELLS: 0 /HPF
GLUCOSE QUALITATIVE U: NEGATIVE MG/DL
KETONES URINE: ABNORMAL MG/DL
LEUKOCYTE ESTERASE URINE: NEGATIVE
MICROSCOPIC-UA: NORMAL
NITRITE URINE: NEGATIVE
PH URINE: 6.5
PROTEIN URINE: NEGATIVE MG/DL
RED BLOOD CELLS URINE: 1 /HPF
SPECIFIC GRAVITY URINE: 1.02
UROBILINOGEN URINE: 0.2 MG/DL
WHITE BLOOD CELLS URINE: 0 /HPF

## 2024-10-04 ENCOUNTER — APPOINTMENT (OUTPATIENT)
Dept: UROGYNECOLOGY | Facility: CLINIC | Age: 74
End: 2024-10-04
Payer: MEDICARE

## 2024-10-04 VITALS — DIASTOLIC BLOOD PRESSURE: 59 MMHG | SYSTOLIC BLOOD PRESSURE: 94 MMHG | HEART RATE: 62 BPM

## 2024-10-04 DIAGNOSIS — N30.10 INTERSTITIAL CYSTITIS (CHRONIC) W/OUT HEMATURIA: ICD-10-CM

## 2024-10-04 PROCEDURE — 51700 IRRIGATION OF BLADDER: CPT

## 2024-10-25 ENCOUNTER — APPOINTMENT (OUTPATIENT)
Dept: UROGYNECOLOGY | Facility: CLINIC | Age: 74
End: 2024-10-25
Payer: MEDICARE

## 2024-10-25 VITALS — TEMPERATURE: 97.6 F | SYSTOLIC BLOOD PRESSURE: 102 MMHG | DIASTOLIC BLOOD PRESSURE: 60 MMHG

## 2024-10-25 DIAGNOSIS — G89.29 PELVIC AND PERINEAL PAIN: ICD-10-CM

## 2024-10-25 DIAGNOSIS — N90.4 LEUKOPLAKIA OF VULVA: ICD-10-CM

## 2024-10-25 DIAGNOSIS — N30.10 INTERSTITIAL CYSTITIS (CHRONIC) W/OUT HEMATURIA: ICD-10-CM

## 2024-10-25 DIAGNOSIS — R10.2 PELVIC AND PERINEAL PAIN: ICD-10-CM

## 2024-10-25 DIAGNOSIS — M79.18 MYALGIA, OTHER SITE: ICD-10-CM

## 2024-10-25 DIAGNOSIS — N95.8 OTHER SPECIFIED MENOPAUSAL AND PERIMENOPAUSAL DISORDERS: ICD-10-CM

## 2024-10-25 PROCEDURE — 99214 OFFICE O/P EST MOD 30 MIN: CPT | Mod: 25

## 2024-10-25 PROCEDURE — 51700 IRRIGATION OF BLADDER: CPT

## 2024-10-29 LAB
APPEARANCE: CLEAR
BACTERIA UR CULT: NORMAL
BACTERIA: NEGATIVE /HPF
BILIRUBIN URINE: NEGATIVE
BLOOD URINE: NEGATIVE
CAST: 0 /LPF
COLOR: NORMAL
EPITHELIAL CELLS: 0 /HPF
GLUCOSE QUALITATIVE U: NEGATIVE MG/DL
KETONES URINE: ABNORMAL MG/DL
LEUKOCYTE ESTERASE URINE: ABNORMAL
MICROSCOPIC-UA: NORMAL
NITRITE URINE: NEGATIVE
PH URINE: 5.5
PROTEIN URINE: NEGATIVE MG/DL
RED BLOOD CELLS URINE: 1 /HPF
SPECIFIC GRAVITY URINE: 1.02
UROBILINOGEN URINE: 0.2 MG/DL
WHITE BLOOD CELLS URINE: 0 /HPF

## 2024-11-11 ENCOUNTER — APPOINTMENT (OUTPATIENT)
Dept: UROGYNECOLOGY | Facility: CLINIC | Age: 74
End: 2024-11-11
Payer: MEDICARE

## 2024-11-11 VITALS
HEIGHT: 63 IN | DIASTOLIC BLOOD PRESSURE: 69 MMHG | SYSTOLIC BLOOD PRESSURE: 101 MMHG | WEIGHT: 128 LBS | BODY MASS INDEX: 22.68 KG/M2

## 2024-11-11 PROCEDURE — 51700 IRRIGATION OF BLADDER: CPT

## 2024-11-15 ENCOUNTER — APPOINTMENT (OUTPATIENT)
Dept: DERMATOLOGY | Facility: CLINIC | Age: 74
End: 2024-11-15
Payer: MEDICARE

## 2024-11-15 PROCEDURE — 99213 OFFICE O/P EST LOW 20 MIN: CPT | Mod: 25

## 2024-11-15 PROCEDURE — 17000 DESTRUCT PREMALG LESION: CPT

## 2024-12-02 ENCOUNTER — APPOINTMENT (OUTPATIENT)
Dept: UROGYNECOLOGY | Facility: CLINIC | Age: 74
End: 2024-12-02
Payer: MEDICARE

## 2024-12-02 VITALS — DIASTOLIC BLOOD PRESSURE: 60 MMHG | SYSTOLIC BLOOD PRESSURE: 96 MMHG

## 2024-12-02 PROCEDURE — 51700 IRRIGATION OF BLADDER: CPT

## 2024-12-03 LAB
CANDIDA VAG CYTO: NOT DETECTED
G VAGINALIS+PREV SP MTYP VAG QL MICRO: NOT DETECTED
T VAGINALIS VAG QL WET PREP: NOT DETECTED

## 2024-12-04 LAB — BACTERIA UR CULT: NORMAL

## 2024-12-23 ENCOUNTER — APPOINTMENT (OUTPATIENT)
Dept: UROGYNECOLOGY | Facility: CLINIC | Age: 74
End: 2024-12-23
Payer: MEDICARE

## 2024-12-23 VITALS — DIASTOLIC BLOOD PRESSURE: 62 MMHG | SYSTOLIC BLOOD PRESSURE: 82 MMHG

## 2024-12-23 DIAGNOSIS — N30.10 INTERSTITIAL CYSTITIS (CHRONIC) W/OUT HEMATURIA: ICD-10-CM

## 2024-12-23 PROCEDURE — 51700 IRRIGATION OF BLADDER: CPT

## 2025-01-13 ENCOUNTER — APPOINTMENT (OUTPATIENT)
Dept: UROGYNECOLOGY | Facility: CLINIC | Age: 75
End: 2025-01-13

## 2025-01-16 RX ORDER — FOSFOMYCIN TROMETHAMINE 3 G/1
3 POWDER ORAL
Qty: 3 | Refills: 0 | Status: ACTIVE | COMMUNITY
Start: 2025-01-16 | End: 1900-01-01

## 2025-02-07 ENCOUNTER — APPOINTMENT (OUTPATIENT)
Dept: UROGYNECOLOGY | Facility: CLINIC | Age: 75
End: 2025-02-07
Payer: MEDICARE

## 2025-02-07 VITALS — TEMPERATURE: 98.3 F | SYSTOLIC BLOOD PRESSURE: 101 MMHG | DIASTOLIC BLOOD PRESSURE: 68 MMHG

## 2025-02-07 DIAGNOSIS — N90.4 LEUKOPLAKIA OF VULVA: ICD-10-CM

## 2025-02-07 DIAGNOSIS — N89.8 OTHER SPECIFIED NONINFLAMMATORY DISORDERS OF VAGINA: ICD-10-CM

## 2025-02-07 DIAGNOSIS — R35.0 FREQUENCY OF MICTURITION: ICD-10-CM

## 2025-02-07 DIAGNOSIS — G89.29 PELVIC AND PERINEAL PAIN: ICD-10-CM

## 2025-02-07 DIAGNOSIS — N39.0 URINARY TRACT INFECTION, SITE NOT SPECIFIED: ICD-10-CM

## 2025-02-07 DIAGNOSIS — M79.18 MYALGIA, OTHER SITE: ICD-10-CM

## 2025-02-07 DIAGNOSIS — N95.8 OTHER SPECIFIED MENOPAUSAL AND PERIMENOPAUSAL DISORDERS: ICD-10-CM

## 2025-02-07 DIAGNOSIS — R10.2 PELVIC AND PERINEAL PAIN: ICD-10-CM

## 2025-02-07 DIAGNOSIS — N94.89 OTHER SPECIFIED CONDITIONS ASSOCIATED WITH FEMALE GENITAL ORGANS AND MENSTRUAL CYCLE: ICD-10-CM

## 2025-02-07 DIAGNOSIS — N30.10 INTERSTITIAL CYSTITIS (CHRONIC) W/OUT HEMATURIA: ICD-10-CM

## 2025-02-07 PROCEDURE — 99214 OFFICE O/P EST MOD 30 MIN: CPT | Mod: 25

## 2025-02-07 PROCEDURE — 51700 IRRIGATION OF BLADDER: CPT

## 2025-02-07 RX ORDER — DIAZEPAM 100 %
POWDER (GRAM) MISCELLANEOUS
Qty: 30 | Refills: 2 | Status: ACTIVE | COMMUNITY
Start: 2025-02-07 | End: 1900-01-01

## 2025-02-08 LAB
APPEARANCE: CLEAR
BACTERIA: NEGATIVE /HPF
BILIRUBIN URINE: ABNORMAL
BLOOD URINE: NEGATIVE
CAST: 0 /LPF
COLOR: NORMAL
EPITHELIAL CELLS: 0 /HPF
GLUCOSE QUALITATIVE U: NEGATIVE MG/DL
KETONES URINE: ABNORMAL MG/DL
LEUKOCYTE ESTERASE URINE: ABNORMAL
MICROSCOPIC-UA: NORMAL
NITRITE URINE: NEGATIVE
PH URINE: 7
PROTEIN URINE: NORMAL MG/DL
RED BLOOD CELLS URINE: 2 /HPF
SPECIFIC GRAVITY URINE: 1.02
UROBILINOGEN URINE: 0.2 MG/DL
WHITE BLOOD CELLS URINE: 0 /HPF

## 2025-02-11 ENCOUNTER — NON-APPOINTMENT (OUTPATIENT)
Age: 75
End: 2025-02-11

## 2025-02-11 LAB
BACTERIA UR CULT: NORMAL
CANDIDA VAG CYTO: NOT DETECTED
G VAGINALIS+PREV SP MTYP VAG QL MICRO: NOT DETECTED
T VAGINALIS VAG QL WET PREP: NOT DETECTED

## 2025-02-20 ENCOUNTER — APPOINTMENT (OUTPATIENT)
Dept: DERMATOLOGY | Facility: CLINIC | Age: 75
End: 2025-02-20
Payer: MEDICARE

## 2025-02-20 PROCEDURE — 17000 DESTRUCT PREMALG LESION: CPT

## 2025-02-20 PROCEDURE — 99213 OFFICE O/P EST LOW 20 MIN: CPT | Mod: 25

## 2025-02-28 ENCOUNTER — APPOINTMENT (OUTPATIENT)
Dept: UROGYNECOLOGY | Facility: CLINIC | Age: 75
End: 2025-02-28

## 2025-03-06 ENCOUNTER — NON-APPOINTMENT (OUTPATIENT)
Age: 75
End: 2025-03-06

## 2025-03-06 ENCOUNTER — APPOINTMENT (OUTPATIENT)
Dept: NEUROLOGY | Facility: CLINIC | Age: 75
End: 2025-03-06
Payer: MEDICARE

## 2025-03-06 VITALS
SYSTOLIC BLOOD PRESSURE: 121 MMHG | HEART RATE: 65 BPM | DIASTOLIC BLOOD PRESSURE: 73 MMHG | BODY MASS INDEX: 21.44 KG/M2 | WEIGHT: 121 LBS | HEIGHT: 63 IN

## 2025-03-06 VITALS — SYSTOLIC BLOOD PRESSURE: 105 MMHG | DIASTOLIC BLOOD PRESSURE: 65 MMHG | HEART RATE: 72 BPM

## 2025-03-06 PROCEDURE — 99215 OFFICE O/P EST HI 40 MIN: CPT

## 2025-03-06 PROCEDURE — G2211 COMPLEX E/M VISIT ADD ON: CPT

## 2025-03-21 ENCOUNTER — APPOINTMENT (OUTPATIENT)
Dept: UROGYNECOLOGY | Facility: CLINIC | Age: 75
End: 2025-03-21
Payer: MEDICARE

## 2025-03-21 VITALS — SYSTOLIC BLOOD PRESSURE: 135 MMHG | DIASTOLIC BLOOD PRESSURE: 80 MMHG | HEART RATE: 61 BPM

## 2025-03-21 DIAGNOSIS — N30.10 INTERSTITIAL CYSTITIS (CHRONIC) W/OUT HEMATURIA: ICD-10-CM

## 2025-03-21 PROCEDURE — 51700 IRRIGATION OF BLADDER: CPT

## 2025-03-27 ENCOUNTER — APPOINTMENT (OUTPATIENT)
Dept: NEUROLOGY | Facility: CLINIC | Age: 75
End: 2025-03-27
Payer: MEDICARE

## 2025-03-27 PROCEDURE — 98016 BRIEF COMUNICAJ TECH-BSD SVC: CPT | Mod: NC

## 2025-04-10 ENCOUNTER — APPOINTMENT (OUTPATIENT)
Dept: UROGYNECOLOGY | Facility: CLINIC | Age: 75
End: 2025-04-10
Payer: MEDICARE

## 2025-04-10 VITALS
SYSTOLIC BLOOD PRESSURE: 106 MMHG | DIASTOLIC BLOOD PRESSURE: 69 MMHG | HEIGHT: 63 IN | HEART RATE: 70 BPM | WEIGHT: 121 LBS | RESPIRATION RATE: 14 BRPM | BODY MASS INDEX: 21.44 KG/M2

## 2025-04-10 PROCEDURE — 51700 IRRIGATION OF BLADDER: CPT

## 2025-04-11 LAB
APPEARANCE: CLEAR
BACTERIA: NEGATIVE /HPF
BILIRUBIN URINE: NEGATIVE
BLOOD URINE: NEGATIVE
CAST: 0 /LPF
COLOR: NORMAL
EPITHELIAL CELLS: 0 /HPF
GLUCOSE QUALITATIVE U: NEGATIVE MG/DL
KETONES URINE: NEGATIVE MG/DL
LEUKOCYTE ESTERASE URINE: NEGATIVE
MICROSCOPIC-UA: NORMAL
NITRITE URINE: NEGATIVE
PH URINE: 5.5
PROTEIN URINE: NEGATIVE MG/DL
RED BLOOD CELLS URINE: 1 /HPF
SPECIFIC GRAVITY URINE: 1.02
UROBILINOGEN URINE: 0.2 MG/DL
WHITE BLOOD CELLS URINE: 0 /HPF

## 2025-04-15 ENCOUNTER — NON-APPOINTMENT (OUTPATIENT)
Age: 75
End: 2025-04-15

## 2025-04-15 LAB — BACTERIA UR CULT: NORMAL

## 2025-04-16 ENCOUNTER — OFFICE (OUTPATIENT)
Dept: URBAN - METROPOLITAN AREA CLINIC 6 | Facility: CLINIC | Age: 75
Setting detail: OPHTHALMOLOGY
End: 2025-04-16
Payer: MEDICARE

## 2025-04-16 DIAGNOSIS — H43.813: ICD-10-CM

## 2025-04-16 DIAGNOSIS — H35.073: ICD-10-CM

## 2025-04-16 DIAGNOSIS — Z96.1: ICD-10-CM

## 2025-04-16 DIAGNOSIS — H02.831: ICD-10-CM

## 2025-04-16 DIAGNOSIS — H02.834: ICD-10-CM

## 2025-04-16 DIAGNOSIS — H35.3131: ICD-10-CM

## 2025-04-16 DIAGNOSIS — H16.223: ICD-10-CM

## 2025-04-16 PROBLEM — H40.013 GLAUCOMA SUSPECT, LOW RISK 1-2 FACTORS; BOTH EYES: Status: ACTIVE | Noted: 2025-04-16

## 2025-04-16 PROCEDURE — 92014 COMPRE OPH EXAM EST PT 1/>: CPT | Performed by: OPHTHALMOLOGY

## 2025-04-16 ASSESSMENT — REFRACTION_CURRENTRX
OD_AXIS: 022
OS_ADD: +2.75
OD_VPRISM_DIRECTION: PROGS
OS_AXIS: 17
OD_SPHERE: +2.50
OD_AXIS: 031
OS_AXIS: 171
OS_VPRISM_DIRECTION: PROGS
OS_SPHERE: +9.00
OD_CYLINDER: -0.50
OS_SPHERE: +3.25
OS_VPRISM_DIRECTION: PROGS
OD_OVR_VA: 20/
OD_OVR_VA: 20/
OD_ADD: +0.75
OD_ADD: +1.25
OS_CYLINDER: -0.75
OD_AXIS: 179
OS_AXIS: 164
OS_SPHERE: +3.50
OS_CYLINDER: -2.25
OS_ADD: +1.25
OS_OVR_VA: 20/
OS_CYLINDER: -1.00
OS_VPRISM_DIRECTION: PROGS
OS_OVR_VA: 20/
OS_CYLINDER: -1.00
OD_SPHERE: +2.50
OD_CYLINDER: -2.25
OD_OVR_VA: 20/
OD_AXIS: 027
OD_CYLINDER: -1.25
OD_SPHERE: +8.50
OS_OVR_VA: 20/
OD_SPHERE: +2.25
OD_CYLINDER: -0.50
OD_VPRISM_DIRECTION: PROGS
OD_VPRISM_DIRECTION: PROGS
OD_ADD: +2.75
OS_AXIS: 061
OS_SPHERE: +4.25

## 2025-04-16 ASSESSMENT — REFRACTION_AUTOREFRACTION
OS_SPHERE: +4.25
OD_AXIS: 055
OS_AXIS: 180
OD_CYLINDER: -0.50
OD_SPHERE: +3.75
OS_CYLINDER: -1.25

## 2025-04-16 ASSESSMENT — REFRACTION_MANIFEST
OD_ADD: +2.75
OD_AXIS: 055
OD_SPHERE: +2.25
OS_ADD: +2.75
OD_SPHERE: +3.75
OS_CYLINDER: -1.75
OS_CYLINDER: -1.25
OD_AXIS: 020
OD_VA1: 20/80-2
OS_SPHERE: +3.75
OD_ADD: +2.75
OU_VA: 20/30+3
OS_SPHERE: +4.25
OD_CYLINDER: -1.25
OS_AXIS: 175
OU_VA: 20/40
OS_AXIS: 180
OD_CYLINDER: -0.50
OS_ADD: +2.75
OS_VA1: 20/60-2

## 2025-04-16 ASSESSMENT — KERATOMETRY
OS_K1POWER_DIOPTERS: 42.00
METHOD_AUTO_MANUAL: AUTO
OD_K1POWER_DIOPTERS: 40.75
OS_K2POWER_DIOPTERS: 43.75
OS_AXISANGLE_DEGREES: 169
OD_AXISANGLE_DEGREES: 178
OD_K2POWER_DIOPTERS: 43.50

## 2025-04-16 ASSESSMENT — CONFRONTATIONAL VISUAL FIELD TEST (CVF)
OD_FINDINGS: FULL
OS_FINDINGS: FULL

## 2025-04-16 ASSESSMENT — VISUAL ACUITY
OS_BCVA: 20/100-
OD_BCVA: 20/40+1

## 2025-04-16 ASSESSMENT — TONOMETRY
OS_IOP_MMHG: 12
OD_IOP_MMHG: 12

## 2025-04-16 ASSESSMENT — SUPERFICIAL PUNCTATE KERATITIS (SPK)
OD_SPK: T 1+
OS_SPK: T 1+

## 2025-04-16 ASSESSMENT — PACHYMETRY
OS_CT_UM: 552
OS_CT_CORRECTION: -1

## 2025-04-16 ASSESSMENT — LID POSITION - DERMATOCHALASIS
OD_DERMATOCHALASIS: RUL 1+ 2+
OS_DERMATOCHALASIS: LUL 1+ 2+

## 2025-05-01 ENCOUNTER — APPOINTMENT (OUTPATIENT)
Dept: UROGYNECOLOGY | Facility: CLINIC | Age: 75
End: 2025-05-01
Payer: MEDICARE

## 2025-05-01 VITALS — DIASTOLIC BLOOD PRESSURE: 58 MMHG | SYSTOLIC BLOOD PRESSURE: 96 MMHG

## 2025-05-01 DIAGNOSIS — N30.10 INTERSTITIAL CYSTITIS (CHRONIC) W/OUT HEMATURIA: ICD-10-CM

## 2025-05-01 DIAGNOSIS — R39.89 OTHER SYMPTOMS AND SIGNS INVOLVING THE GENITOURINARY SYSTEM: ICD-10-CM

## 2025-05-01 DIAGNOSIS — M79.18 MYALGIA, OTHER SITE: ICD-10-CM

## 2025-05-01 PROCEDURE — 51700 IRRIGATION OF BLADDER: CPT

## 2025-05-02 ENCOUNTER — NON-APPOINTMENT (OUTPATIENT)
Age: 75
End: 2025-05-02

## 2025-05-12 ENCOUNTER — APPOINTMENT (OUTPATIENT)
Facility: CLINIC | Age: 75
End: 2025-05-12

## 2025-05-13 ENCOUNTER — OFFICE (OUTPATIENT)
Dept: URBAN - METROPOLITAN AREA CLINIC 6 | Facility: CLINIC | Age: 75
Setting detail: OPHTHALMOLOGY
End: 2025-05-13
Payer: MEDICARE

## 2025-05-13 DIAGNOSIS — H35.073: ICD-10-CM

## 2025-05-13 DIAGNOSIS — H16.223: ICD-10-CM

## 2025-05-13 DIAGNOSIS — H02.831: ICD-10-CM

## 2025-05-13 DIAGNOSIS — H40.013: ICD-10-CM

## 2025-05-13 DIAGNOSIS — Z96.1: ICD-10-CM

## 2025-05-13 DIAGNOSIS — H02.834: ICD-10-CM

## 2025-05-13 PROCEDURE — 99213 OFFICE O/P EST LOW 20 MIN: CPT | Performed by: OPHTHALMOLOGY

## 2025-05-13 ASSESSMENT — REFRACTION_MANIFEST
OS_ADD: +2.75
OS_AXIS: 175
OS_CYLINDER: -1.75
OD_CYLINDER: -1.25
OD_ADD: +2.75
OS_SPHERE: +4.25
OU_VA: 20/40
OD_AXIS: 020
OS_CYLINDER: -1.25
OD_AXIS: 055
OU_VA: 20/30+3
OS_AXIS: 180
OD_VA1: 20/80-2
OD_SPHERE: +3.75
OS_VA1: 20/60-2
OS_SPHERE: +3.75
OS_ADD: +2.75
OD_CYLINDER: -0.50
OD_ADD: +2.75
OD_SPHERE: +2.25

## 2025-05-13 ASSESSMENT — REFRACTION_CURRENTRX
OD_SPHERE: +8.50
OS_ADD: +1.25
OS_VPRISM_DIRECTION: PROGS
OS_CYLINDER: -0.75
OD_CYLINDER: -0.50
OD_AXIS: 031
OD_ADD: +0.75
OS_CYLINDER: -1.00
OS_SPHERE: +4.25
OD_ADD: +2.75
OS_AXIS: 061
OS_SPHERE: +3.50
OS_VPRISM_DIRECTION: PROGS
OD_SPHERE: +2.25
OS_VPRISM_DIRECTION: PROGS
OS_CYLINDER: -2.25
OD_CYLINDER: -2.25
OS_SPHERE: +9.00
OD_VPRISM_DIRECTION: PROGS
OS_AXIS: 171
OS_CYLINDER: -1.00
OD_OVR_VA: 20/
OD_AXIS: 179
OD_AXIS: 022
OD_ADD: +1.25
OD_CYLINDER: -0.50
OD_CYLINDER: -1.25
OS_OVR_VA: 20/
OD_OVR_VA: 20/
OD_SPHERE: +2.50
OS_ADD: +2.75
OS_AXIS: 17
OS_AXIS: 164
OD_VPRISM_DIRECTION: PROGS
OD_AXIS: 027
OS_SPHERE: +3.25
OD_SPHERE: +2.50
OD_OVR_VA: 20/
OD_VPRISM_DIRECTION: PROGS

## 2025-05-13 ASSESSMENT — REFRACTION_AUTOREFRACTION
OS_SPHERE: +4.25
OD_CYLINDER: -1.25
OD_AXIS: 032
OD_SPHERE: +3.50
OS_AXIS: 173
OS_CYLINDER: -1.00

## 2025-05-13 ASSESSMENT — KERATOMETRY
OS_AXISANGLE_DEGREES: 078
OS_K1POWER_DIOPTERS: 42.50
METHOD_AUTO_MANUAL: AUTO
OD_AXISANGLE_DEGREES: 090
OD_K1POWER_DIOPTERS: 41.50
OS_K2POWER_DIOPTERS: 44.00
OD_K2POWER_DIOPTERS: 44.00

## 2025-05-13 ASSESSMENT — VISUAL ACUITY
OD_BCVA: 20/40-1
OS_BCVA: 20/80

## 2025-05-13 ASSESSMENT — LID POSITION - DERMATOCHALASIS
OD_DERMATOCHALASIS: RUL 1+ 2+
OS_DERMATOCHALASIS: LUL 1+ 2+

## 2025-05-13 ASSESSMENT — SUPERFICIAL PUNCTATE KERATITIS (SPK)
OS_SPK: T 1+
OD_SPK: T 1+

## 2025-05-13 ASSESSMENT — TONOMETRY
OD_IOP_MMHG: 20
OS_IOP_MMHG: 16

## 2025-05-13 ASSESSMENT — PACHYMETRY
OS_CT_CORRECTION: -1
OS_CT_UM: 552

## 2025-05-22 ENCOUNTER — APPOINTMENT (OUTPATIENT)
Dept: DERMATOLOGY | Facility: CLINIC | Age: 75
End: 2025-05-22

## 2025-05-23 ENCOUNTER — APPOINTMENT (OUTPATIENT)
Dept: UROGYNECOLOGY | Facility: CLINIC | Age: 75
End: 2025-05-23
Payer: MEDICARE

## 2025-05-23 VITALS — SYSTOLIC BLOOD PRESSURE: 104 MMHG | DIASTOLIC BLOOD PRESSURE: 66 MMHG | HEART RATE: 68 BPM

## 2025-05-23 DIAGNOSIS — N30.10 INTERSTITIAL CYSTITIS (CHRONIC) W/OUT HEMATURIA: ICD-10-CM

## 2025-05-23 PROCEDURE — 51700 IRRIGATION OF BLADDER: CPT

## 2025-05-27 LAB
APPEARANCE: CLEAR
BACTERIA UR CULT: NORMAL
BACTERIA: NEGATIVE /HPF
BILIRUBIN URINE: NEGATIVE
BLOOD URINE: NEGATIVE
CAST: 2 /LPF
COLOR: NORMAL
EPITHELIAL CELLS: 0 /HPF
GLUCOSE QUALITATIVE U: NEGATIVE MG/DL
KETONES URINE: NEGATIVE MG/DL
LEUKOCYTE ESTERASE URINE: NEGATIVE
MICROSCOPIC-UA: NORMAL
NITRITE URINE: NEGATIVE
PH URINE: 5.5
PROTEIN URINE: NEGATIVE MG/DL
RED BLOOD CELLS URINE: 1 /HPF
SPECIFIC GRAVITY URINE: 1.02
UROBILINOGEN URINE: 0.2 MG/DL
WHITE BLOOD CELLS URINE: 0 /HPF

## 2025-06-03 ENCOUNTER — APPOINTMENT (OUTPATIENT)
Facility: CLINIC | Age: 75
End: 2025-06-03
Payer: MEDICARE

## 2025-06-03 PROCEDURE — 99214 OFFICE O/P EST MOD 30 MIN: CPT | Mod: 2W

## 2025-06-03 PROCEDURE — G2211 COMPLEX E/M VISIT ADD ON: CPT | Mod: 2W

## 2025-06-16 ENCOUNTER — APPOINTMENT (OUTPATIENT)
Dept: UROGYNECOLOGY | Facility: CLINIC | Age: 75
End: 2025-06-16
Payer: MEDICARE

## 2025-06-16 VITALS
HEIGHT: 63 IN | HEART RATE: 67 BPM | BODY MASS INDEX: 21.44 KG/M2 | SYSTOLIC BLOOD PRESSURE: 98 MMHG | DIASTOLIC BLOOD PRESSURE: 62 MMHG | WEIGHT: 121 LBS

## 2025-06-16 PROCEDURE — 51700 IRRIGATION OF BLADDER: CPT

## 2025-07-01 ENCOUNTER — NON-APPOINTMENT (OUTPATIENT)
Age: 75
End: 2025-07-01

## 2025-07-01 ENCOUNTER — APPOINTMENT (OUTPATIENT)
Dept: DERMATOLOGY | Facility: CLINIC | Age: 75
End: 2025-07-01

## 2025-07-01 LAB
APPEARANCE: CLEAR
BACTERIA UR CULT: NORMAL
BACTERIA: NEGATIVE /HPF
BILIRUBIN URINE: NEGATIVE
BLOOD URINE: NEGATIVE
CAST: 0 /LPF
COLOR: NORMAL
EPITHELIAL CELLS: 0 /HPF
GLUCOSE QUALITATIVE U: NEGATIVE MG/DL
KETONES URINE: NEGATIVE MG/DL
LEUKOCYTE ESTERASE URINE: ABNORMAL
MICROSCOPIC-UA: NORMAL
NITRITE URINE: NEGATIVE
PH URINE: 7
PROTEIN URINE: NEGATIVE MG/DL
RED BLOOD CELLS URINE: 1 /HPF
SPECIFIC GRAVITY URINE: 1.02
UROBILINOGEN URINE: 1 MG/DL
WHITE BLOOD CELLS URINE: 0 /HPF

## 2025-07-02 ENCOUNTER — NON-APPOINTMENT (OUTPATIENT)
Age: 75
End: 2025-07-02

## 2025-07-09 ENCOUNTER — APPOINTMENT (OUTPATIENT)
Dept: UROGYNECOLOGY | Facility: CLINIC | Age: 75
End: 2025-07-09

## 2025-07-11 ENCOUNTER — APPOINTMENT (OUTPATIENT)
Dept: UROGYNECOLOGY | Facility: CLINIC | Age: 75
End: 2025-07-11
Payer: MEDICARE

## 2025-07-11 VITALS — TEMPERATURE: 98 F | DIASTOLIC BLOOD PRESSURE: 65 MMHG | SYSTOLIC BLOOD PRESSURE: 123 MMHG

## 2025-07-11 PROCEDURE — 99214 OFFICE O/P EST MOD 30 MIN: CPT | Mod: 25

## 2025-07-11 PROCEDURE — 51700 IRRIGATION OF BLADDER: CPT

## 2025-07-15 ENCOUNTER — NON-APPOINTMENT (OUTPATIENT)
Age: 75
End: 2025-07-15

## 2025-07-15 LAB
APPEARANCE: CLEAR
BACTERIA UR CULT: NORMAL
BACTERIA: NEGATIVE /HPF
BILIRUBIN URINE: NEGATIVE
BLOOD URINE: NEGATIVE
CAST: 0 /LPF
COLOR: NORMAL
EPITHELIAL CELLS: 0 /HPF
GLUCOSE QUALITATIVE U: NEGATIVE MG/DL
KETONES URINE: NEGATIVE MG/DL
LEUKOCYTE ESTERASE URINE: NEGATIVE
MICROSCOPIC-UA: NORMAL
NITRITE URINE: NEGATIVE
PH URINE: 5.5
PROTEIN URINE: NEGATIVE MG/DL
RED BLOOD CELLS URINE: 0 /HPF
SPECIFIC GRAVITY URINE: 1.02
UROBILINOGEN URINE: 0.2 MG/DL
WHITE BLOOD CELLS URINE: 0 /HPF

## 2025-07-22 ENCOUNTER — APPOINTMENT (OUTPATIENT)
Dept: DERMATOLOGY | Facility: CLINIC | Age: 75
End: 2025-07-22
Payer: MEDICARE

## 2025-07-22 PROCEDURE — 17000 DESTRUCT PREMALG LESION: CPT

## 2025-07-22 PROCEDURE — 99214 OFFICE O/P EST MOD 30 MIN: CPT | Mod: 25

## 2025-07-22 PROCEDURE — 17003 DESTRUCT PREMALG LES 2-14: CPT

## 2025-07-24 ENCOUNTER — APPOINTMENT (OUTPATIENT)
Dept: UROGYNECOLOGY | Facility: CLINIC | Age: 75
End: 2025-07-24

## 2025-07-24 ENCOUNTER — APPOINTMENT (OUTPATIENT)
Dept: OTOLARYNGOLOGY | Facility: CLINIC | Age: 75
End: 2025-07-24
Payer: MEDICARE

## 2025-07-24 VITALS
SYSTOLIC BLOOD PRESSURE: 119 MMHG | DIASTOLIC BLOOD PRESSURE: 72 MMHG | HEIGHT: 63 IN | HEART RATE: 64 BPM | BODY MASS INDEX: 21.26 KG/M2 | WEIGHT: 120 LBS

## 2025-07-24 DIAGNOSIS — J34.89 OTHER SPECIFIED DISORDERS OF NOSE AND NASAL SINUSES: ICD-10-CM

## 2025-07-24 DIAGNOSIS — M26.609 UNSPECIFIED TEMPOROMANDIBULAR JOINT DISORDER: ICD-10-CM

## 2025-07-24 DIAGNOSIS — J31.0 CHRONIC RHINITIS: ICD-10-CM

## 2025-07-24 DIAGNOSIS — J32.9 CHRONIC SINUSITIS, UNSPECIFIED: ICD-10-CM

## 2025-07-24 PROCEDURE — 99203 OFFICE O/P NEW LOW 30 MIN: CPT

## 2025-07-24 RX ORDER — FLUTICASONE PROPIONATE 50 UG/1
50 SPRAY NASAL
Qty: 1 | Refills: 2 | Status: ACTIVE | COMMUNITY
Start: 2025-07-24 | End: 1900-01-01

## 2025-07-28 PROBLEM — M26.609 TMJ DYSFUNCTION: Status: ACTIVE | Noted: 2025-07-28

## 2025-07-28 PROBLEM — J34.89 SINUS PRESSURE: Status: ACTIVE | Noted: 2025-07-24

## 2025-07-28 PROBLEM — J31.0 RHINITIS: Status: ACTIVE | Noted: 2025-07-24

## 2025-07-28 PROBLEM — J32.9 SINUSITIS: Status: ACTIVE | Noted: 2025-07-28

## 2025-07-30 RX ORDER — FLUCONAZOLE 150 MG/1
150 TABLET ORAL
Qty: 1 | Refills: 0 | Status: ACTIVE | COMMUNITY
Start: 2025-07-30 | End: 1900-01-01

## 2025-07-31 ENCOUNTER — OUTPATIENT (OUTPATIENT)
Dept: OUTPATIENT SERVICES | Facility: HOSPITAL | Age: 75
LOS: 1 days | End: 2025-07-31
Payer: MEDICARE

## 2025-07-31 ENCOUNTER — APPOINTMENT (OUTPATIENT)
Dept: CT IMAGING | Facility: CLINIC | Age: 75
End: 2025-07-31
Payer: MEDICARE

## 2025-07-31 DIAGNOSIS — Z98.89 OTHER SPECIFIED POSTPROCEDURAL STATES: Chronic | ICD-10-CM

## 2025-07-31 DIAGNOSIS — J34.89 OTHER SPECIFIED DISORDERS OF NOSE AND NASAL SINUSES: ICD-10-CM

## 2025-07-31 PROCEDURE — 70486 CT MAXILLOFACIAL W/O DYE: CPT

## 2025-07-31 PROCEDURE — 70486 CT MAXILLOFACIAL W/O DYE: CPT | Mod: 26

## 2025-07-31 RX ORDER — TRIAMCINOLONE ACETONIDE 55 UG/1
55 SPRAY, METERED NASAL
Qty: 1 | Refills: 2 | Status: ACTIVE | COMMUNITY
Start: 2025-07-31 | End: 1900-01-01

## 2025-08-08 ENCOUNTER — APPOINTMENT (OUTPATIENT)
Dept: UROGYNECOLOGY | Facility: CLINIC | Age: 75
End: 2025-08-08

## 2025-08-12 ENCOUNTER — OFFICE (OUTPATIENT)
Dept: URBAN - METROPOLITAN AREA CLINIC 6 | Facility: CLINIC | Age: 75
Setting detail: OPHTHALMOLOGY
End: 2025-08-12
Payer: MEDICARE

## 2025-08-12 DIAGNOSIS — H16.223: ICD-10-CM

## 2025-08-12 DIAGNOSIS — H02.831: ICD-10-CM

## 2025-08-12 DIAGNOSIS — H02.834: ICD-10-CM

## 2025-08-12 DIAGNOSIS — Z96.1: ICD-10-CM

## 2025-08-12 DIAGNOSIS — H35.073: ICD-10-CM

## 2025-08-12 DIAGNOSIS — H40.013: ICD-10-CM

## 2025-08-12 PROCEDURE — 92133 CPTRZD OPH DX IMG PST SGM ON: CPT | Performed by: OPHTHALMOLOGY

## 2025-08-12 PROCEDURE — 99213 OFFICE O/P EST LOW 20 MIN: CPT | Performed by: OPHTHALMOLOGY

## 2025-08-12 PROCEDURE — 92083 EXTENDED VISUAL FIELD XM: CPT | Performed by: OPHTHALMOLOGY

## 2025-08-12 ASSESSMENT — REFRACTION_MANIFEST
OU_VA: 20/40
OS_CYLINDER: -1.25
OD_ADD: +2.75
OD_VA1: 20/80-2
OS_ADD: +2.75
OU_VA: 20/30+3
OS_AXIS: 180
OD_ADD: +2.75
OS_ADD: +2.75
OD_AXIS: 055
OS_CYLINDER: -1.75
OS_VA1: 20/60-2
OS_SPHERE: +4.25
OD_AXIS: 020
OD_SPHERE: +3.75
OS_SPHERE: +3.75
OD_CYLINDER: -0.50
OS_AXIS: 175
OD_SPHERE: +2.25
OD_CYLINDER: -1.25

## 2025-08-12 ASSESSMENT — SUPERFICIAL PUNCTATE KERATITIS (SPK)
OD_SPK: T 1+
OS_SPK: T 1+

## 2025-08-12 ASSESSMENT — REFRACTION_CURRENTRX
OD_ADD: +2.75
OS_OVR_VA: 20/
OD_CYLINDER: -1.25
OS_CYLINDER: -1.00
OS_ADD: +1.25
OD_AXIS: 022
OD_VPRISM_DIRECTION: PROGS
OS_SPHERE: +3.25
OD_AXIS: 027
OS_AXIS: 061
OD_CYLINDER: -0.50
OD_SPHERE: +2.25
OD_OVR_VA: 20/
OS_CYLINDER: -2.25
OD_OVR_VA: 20/
OS_AXIS: 17
OD_VPRISM_DIRECTION: PROGS
OS_VPRISM_DIRECTION: PROGS
OS_OVR_VA: 20/
OS_SPHERE: +4.25
OS_OVR_VA: 20/
OS_VPRISM_DIRECTION: PROGS
OD_SPHERE: +2.50
OD_ADD: +1.25
OS_ADD: +2.75
OD_OVR_VA: 20/

## 2025-08-12 ASSESSMENT — TONOMETRY
OS_IOP_MMHG: 16
OD_IOP_MMHG: 16

## 2025-08-12 ASSESSMENT — REFRACTION_AUTOREFRACTION
OS_CYLINDER: -1.25
OD_AXIS: 032
OS_SPHERE: +4.25
OS_AXIS: 176
OD_SPHERE: +3.00
OD_CYLINDER: -1.75

## 2025-08-12 ASSESSMENT — KERATOMETRY
OD_K1POWER_DIOPTERS: 41.50
OD_AXISANGLE_DEGREES: 085
OS_K1POWER_DIOPTERS: 42.50
OD_K2POWER_DIOPTERS: 44.25
OS_K2POWER_DIOPTERS: 44.75
METHOD_AUTO_MANUAL: AUTO
OS_AXISANGLE_DEGREES: 081

## 2025-08-12 ASSESSMENT — PACHYMETRY
OS_CT_CORRECTION: -1
OS_CT_UM: 552

## 2025-08-12 ASSESSMENT — LID POSITION - DERMATOCHALASIS
OD_DERMATOCHALASIS: RUL 1+ 2+
OS_DERMATOCHALASIS: LUL 1+ 2+

## 2025-08-12 ASSESSMENT — CONFRONTATIONAL VISUAL FIELD TEST (CVF)
OD_FINDINGS: FULL
OS_FINDINGS: FULL

## 2025-08-12 ASSESSMENT — VISUAL ACUITY
OD_BCVA: 20/30+2
OS_BCVA: 20/80+1

## 2025-08-20 ENCOUNTER — RX RENEWAL (OUTPATIENT)
Age: 75
End: 2025-08-20

## 2025-08-22 ENCOUNTER — APPOINTMENT (OUTPATIENT)
Dept: UROGYNECOLOGY | Facility: CLINIC | Age: 75
End: 2025-08-22
Payer: MEDICARE

## 2025-08-22 VITALS — DIASTOLIC BLOOD PRESSURE: 70 MMHG | HEART RATE: 75 BPM | SYSTOLIC BLOOD PRESSURE: 120 MMHG

## 2025-08-22 DIAGNOSIS — N30.10 INTERSTITIAL CYSTITIS (CHRONIC) W/OUT HEMATURIA: ICD-10-CM

## 2025-08-22 PROCEDURE — 51700 IRRIGATION OF BLADDER: CPT

## 2025-09-04 ENCOUNTER — APPOINTMENT (OUTPATIENT)
Dept: OTOLARYNGOLOGY | Facility: CLINIC | Age: 75
End: 2025-09-04
Payer: MEDICARE

## 2025-09-04 VITALS
HEIGHT: 63 IN | DIASTOLIC BLOOD PRESSURE: 77 MMHG | WEIGHT: 120 LBS | HEART RATE: 74 BPM | SYSTOLIC BLOOD PRESSURE: 129 MMHG | BODY MASS INDEX: 21.26 KG/M2

## 2025-09-04 DIAGNOSIS — H61.22 IMPACTED CERUMEN, LEFT EAR: ICD-10-CM

## 2025-09-04 PROCEDURE — 99213 OFFICE O/P EST LOW 20 MIN: CPT | Mod: 25

## 2025-09-04 PROCEDURE — 69210 REMOVE IMPACTED EAR WAX UNI: CPT

## 2025-09-05 ENCOUNTER — APPOINTMENT (OUTPATIENT)
Dept: UROGYNECOLOGY | Facility: CLINIC | Age: 75
End: 2025-09-05
Payer: MEDICARE

## 2025-09-05 VITALS — SYSTOLIC BLOOD PRESSURE: 122 MMHG | DIASTOLIC BLOOD PRESSURE: 73 MMHG | HEART RATE: 67 BPM

## 2025-09-05 DIAGNOSIS — N30.10 INTERSTITIAL CYSTITIS (CHRONIC) W/OUT HEMATURIA: ICD-10-CM

## 2025-09-05 PROCEDURE — 51700 IRRIGATION OF BLADDER: CPT

## 2025-09-11 ENCOUNTER — APPOINTMENT (OUTPATIENT)
Dept: NEUROLOGY | Facility: CLINIC | Age: 75
End: 2025-09-11

## 2025-09-19 ENCOUNTER — APPOINTMENT (OUTPATIENT)
Dept: UROGYNECOLOGY | Facility: CLINIC | Age: 75
End: 2025-09-19
Payer: MEDICARE

## 2025-09-19 VITALS — SYSTOLIC BLOOD PRESSURE: 156 MMHG | DIASTOLIC BLOOD PRESSURE: 84 MMHG

## 2025-09-19 DIAGNOSIS — N30.10 INTERSTITIAL CYSTITIS (CHRONIC) W/OUT HEMATURIA: ICD-10-CM

## 2025-09-19 PROCEDURE — 51700 IRRIGATION OF BLADDER: CPT
